# Patient Record
Sex: FEMALE | Race: WHITE | NOT HISPANIC OR LATINO | Employment: FULL TIME | URBAN - METROPOLITAN AREA
[De-identification: names, ages, dates, MRNs, and addresses within clinical notes are randomized per-mention and may not be internally consistent; named-entity substitution may affect disease eponyms.]

---
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
## 2021-01-04 ENCOUNTER — HEALTH MAINTENANCE LETTER (OUTPATIENT)
Age: 21
End: 2021-01-04

## 2021-04-06 ENCOUNTER — COMMUNICATION - HEALTHEAST (OUTPATIENT)
Dept: FAMILY MEDICINE | Facility: CLINIC | Age: 21
End: 2021-04-06

## 2021-04-06 ENCOUNTER — OFFICE VISIT - HEALTHEAST (OUTPATIENT)
Dept: FAMILY MEDICINE | Facility: CLINIC | Age: 21
End: 2021-04-06

## 2021-04-06 DIAGNOSIS — F41.0 PANIC ATTACK: ICD-10-CM

## 2021-04-06 DIAGNOSIS — G47.09 OTHER INSOMNIA: ICD-10-CM

## 2021-04-06 DIAGNOSIS — F33.1 MODERATE RECURRENT MAJOR DEPRESSION (H): ICD-10-CM

## 2021-04-06 DIAGNOSIS — E66.3 OVERWEIGHT: ICD-10-CM

## 2021-04-06 DIAGNOSIS — F43.22 ADJUSTMENT DISORDER WITH ANXIOUS MOOD: ICD-10-CM

## 2021-04-06 DIAGNOSIS — F41.9 ANXIETY: ICD-10-CM

## 2021-04-06 DIAGNOSIS — F32.1 MODERATE MAJOR DEPRESSION (H): ICD-10-CM

## 2021-04-06 ASSESSMENT — PATIENT HEALTH QUESTIONNAIRE - PHQ9: SUM OF ALL RESPONSES TO PHQ QUESTIONS 1-9: 9

## 2021-04-06 ASSESSMENT — ANXIETY QUESTIONNAIRES
1. FEELING NERVOUS, ANXIOUS, OR ON EDGE: MORE THAN HALF THE DAYS
IF YOU CHECKED OFF ANY PROBLEMS ON THIS QUESTIONNAIRE, HOW DIFFICULT HAVE THESE PROBLEMS MADE IT FOR YOU TO DO YOUR WORK, TAKE CARE OF THINGS AT HOME, OR GET ALONG WITH OTHER PEOPLE: SOMEWHAT DIFFICULT
3. WORRYING TOO MUCH ABOUT DIFFERENT THINGS: MORE THAN HALF THE DAYS
GAD7 TOTAL SCORE: 14
5. BEING SO RESTLESS THAT IT IS HARD TO SIT STILL: MORE THAN HALF THE DAYS
7. FEELING AFRAID AS IF SOMETHING AWFUL MIGHT HAPPEN: NEARLY EVERY DAY
6. BECOMING EASILY ANNOYED OR IRRITABLE: SEVERAL DAYS
2. NOT BEING ABLE TO STOP OR CONTROL WORRYING: MORE THAN HALF THE DAYS
4. TROUBLE RELAXING: MORE THAN HALF THE DAYS

## 2021-04-06 ASSESSMENT — MIFFLIN-ST. JEOR: SCORE: 1628.18

## 2021-04-27 ENCOUNTER — OFFICE VISIT - HEALTHEAST (OUTPATIENT)
Dept: FAMILY MEDICINE | Facility: CLINIC | Age: 21
End: 2021-04-27

## 2021-04-27 DIAGNOSIS — R43.8 BAD TASTE IN MOUTH: ICD-10-CM

## 2021-04-27 DIAGNOSIS — F32.1 MODERATE MAJOR DEPRESSION (H): ICD-10-CM

## 2021-04-27 DIAGNOSIS — F43.22 ADJUSTMENT DISORDER WITH ANXIOUS MOOD: ICD-10-CM

## 2021-04-27 DIAGNOSIS — B07.8 COMMON WART: ICD-10-CM

## 2021-04-27 ASSESSMENT — MIFFLIN-ST. JEOR: SCORE: 1629.31

## 2021-04-28 ENCOUNTER — COMMUNICATION - HEALTHEAST (OUTPATIENT)
Dept: NURSING | Facility: CLINIC | Age: 21
End: 2021-04-28

## 2021-05-05 ENCOUNTER — COMMUNICATION - HEALTHEAST (OUTPATIENT)
Dept: FAMILY MEDICINE | Facility: CLINIC | Age: 21
End: 2021-05-05
Payer: COMMERCIAL

## 2021-05-18 ENCOUNTER — OFFICE VISIT - HEALTHEAST (OUTPATIENT)
Dept: FAMILY MEDICINE | Facility: CLINIC | Age: 21
End: 2021-05-18

## 2021-05-18 DIAGNOSIS — Z59.9 FINANCIAL PROBLEMS: ICD-10-CM

## 2021-05-18 DIAGNOSIS — F32.1 MODERATE MAJOR DEPRESSION (H): ICD-10-CM

## 2021-05-18 DIAGNOSIS — B07.0 PLANTAR WART OF RIGHT FOOT: ICD-10-CM

## 2021-05-18 DIAGNOSIS — F43.22 ADJUSTMENT DISORDER WITH ANXIOUS MOOD: ICD-10-CM

## 2021-05-18 DIAGNOSIS — G47.09 OTHER INSOMNIA: ICD-10-CM

## 2021-05-18 DIAGNOSIS — F41.9 ANXIETY: ICD-10-CM

## 2021-05-18 DIAGNOSIS — E66.3 OVERWEIGHT: ICD-10-CM

## 2021-05-18 SDOH — ECONOMIC STABILITY - INCOME SECURITY: PROBLEM RELATED TO HOUSING AND ECONOMIC CIRCUMSTANCES, UNSPECIFIED: Z59.9

## 2021-05-18 ASSESSMENT — MIFFLIN-ST. JEOR: SCORE: 1619.1

## 2021-05-19 ENCOUNTER — COMMUNICATION - HEALTHEAST (OUTPATIENT)
Dept: NURSING | Facility: CLINIC | Age: 21
End: 2021-05-19

## 2021-05-19 ENCOUNTER — RECORDS - HEALTHEAST (OUTPATIENT)
Dept: ADMINISTRATIVE | Facility: OTHER | Age: 21
End: 2021-05-19

## 2021-05-20 ENCOUNTER — RECORDS - HEALTHEAST (OUTPATIENT)
Dept: ADMINISTRATIVE | Facility: OTHER | Age: 21
End: 2021-05-20

## 2021-05-26 NOTE — TELEPHONE ENCOUNTER
No PA is needed for this medication and strength. Insurance allows up to 30 tablets taking once daily.   Called [facility] and according to pharmacist she fixed the problem on her end and now it is just a refill too soon until tomorrow 3/23/19.    They will call patient to let her know it is ready for  tomorrow.

## 2021-05-27 VITALS
WEIGHT: 180 LBS | HEIGHT: 67 IN | SYSTOLIC BLOOD PRESSURE: 120 MMHG | OXYGEN SATURATION: 99 % | TEMPERATURE: 98.2 F | HEART RATE: 94 BPM | DIASTOLIC BLOOD PRESSURE: 70 MMHG | BODY MASS INDEX: 28.25 KG/M2 | RESPIRATION RATE: 20 BRPM

## 2021-05-27 VITALS
OXYGEN SATURATION: 97 % | DIASTOLIC BLOOD PRESSURE: 68 MMHG | HEIGHT: 67 IN | TEMPERATURE: 98.4 F | RESPIRATION RATE: 28 BRPM | WEIGHT: 182 LBS | BODY MASS INDEX: 28.56 KG/M2 | SYSTOLIC BLOOD PRESSURE: 120 MMHG | HEART RATE: 107 BPM

## 2021-05-27 NOTE — TELEPHONE ENCOUNTER
Patient called back, reviewed provider message in regards to her test results. Patient has no further questions at this time.     Mihaela Coleman RN [facility] Triage/Med Refill 4/11/2019 4:38 PM

## 2021-05-27 NOTE — TELEPHONE ENCOUNTER
Patient notified per provider note below. The patient verbalizes understanding of provider/CSS instructions for follow-up and continued care per provider message.

## 2021-05-27 NOTE — PROGRESS NOTES
Assessment/Plan:        Diagnoses and all orders for this visit:    Thyromegaly- diffuse.  No know hx thyroid disease.  Will notify pt when lab results available.   -     Thyroid [place]    Sore throat- explained that if culture comes back positive, we will treat with abx  -     Rapid Strep A Screen- Throat Swab  -     Group A Strep, RNA Direct Detection, Throat    Encounter for counseling regarding contraception  Discussed in detail the types of contraception and their potential side effects.  Handout given from [email or web address], as well as a pictoral handout.  She would like to talk to her 2 older sisters about what they use and discuss it further at her appt with Dr. Gabriel next week.      Other orders  -     Meningococcal MCV4P          Subjective:    Patient ID: Dena Chavez is a 19 y.o. female.    HPI:  Has had a sensation of having a lump in the right side of her neck for the past 5 days.  Doesn't hurt when she swallows, eating and drinking OK.  No fever, cough, chills.  Has been having HA occasionally. No known exposures to anyone that is ill. No excess fatigue.      Would like to learn about the different forms of contraception.  Has never had sex.  Declined chlamydia testing today.  Has had irregular periods ever since her periods started.  No cramping with her periods.        Graduation from  this year.  Thinks she would like to start a business, not sure of what kind.     The following portions of the patient's history were reviewed and updated as appropriate: allergies, current medications, past family history, past medical history, past social history, past surgical history and problem list.    Review of Systems      12 sys rev neg other than HPI    Objective:    Physical Exam       Patient is in no apparent physical distress.  Vitals are as recorded.  Head and face are normal. TM's and external canals nl. Pharynx clear, no tonsils, no erythema or exudate.  Conjunctiva are clear.  Neck is  without adenopathy or masses.Thyroid mildly diffusely enlarged.   Cardiovascular :  Regular rate and rhythm with no murmurs.  Lungs are clear with good air movement bilaterally.  Extremities are without edema.  Gait is normal.  Skin is without rashes.  Mood and affect are appropriate.thought processes and judgement nl.

## 2021-05-27 NOTE — PROGRESS NOTES
OFFICE VISIT NOTE      Assessment & Plan   Dena Chavez is a 19 y.o. female with anxiety and depression. She thinks her main problem is in the winter and now, with better weather, that she's fine. She denies panic attacks but admits she's still failing math. She agrees to keep working at that. She stopped taking her meds on her own and refuses to restart them. She does not want to do any more counseling because she feels better. I asked her to resume the counseling because she can now learn things that will help her next fall with seasonal depression/anxiety. She agreed with the idea I presented but did not commit to doing anything    Exercise -she is doing some. Sleep is reportedly OK. No suicidal ideation.    She asked for information about contraception, but then said she's not sexually active, so she'll wait to use it. If she uses it, she thinks she would want depo or Nexplanon because she would not remember to take the pill daily.    Diagnoses and all orders for this visit:    Overweight    Anxiety    Stress due to family tension    Panic attack    Adjustment disorder with anxious mood    Other insomnia        Kamryn Gabriel MD    The following are part of a depression follow up plan for the patient:  under care of mental health counselor, coping support assessment and coping support management  The following high BMI interventions were performed this visit: encouragement to exercise          Subjective:   Chief Complaint:  Follow-up (anxiety)    19 y.o. female.     1) good - weather is nice  Did counseling once - does not want to do it more - was told she has a $40 copay at the  and she's not sure why.    2) not passing math - getting some help    3) gets to sleep every night, about 11pm - 7am; 1st hour is usually late.    4) works - good - does 20hrs/week    5) walks outside now that it is nice out.    6) panic - occasionally, does not use med for it    7) stopped taking the medication -  "since last visit    8) birth control - wants to know options    Current Outpatient Medications   Medication Sig     buPROPion (WELLBUTRIN XL) 150 MG 24 hr tablet Take 1 tablet (150 mg total) by mouth daily.     cholecalciferol, vitamin D3, 5,000 unit capsule Take 1 capsule (5,000 Units total) by mouth daily.     hydrOXYzine HCl (ATARAX) 25 MG tablet Take 1 tablet (25 mg total) by mouth 3 (three) times a day as needed for itching. Or for anxiety       PSFHx: appropriate sections of PMH completed/filled in   Tobacco Status:  She  reports that she has never smoked. She has never used smokeless tobacco.    Review of Systems:  No fever.  No rash. All other systems negative except as noted above.    Objective:    /60   Pulse 108   Temp 99.2  F (37.3  C) (Oral)   Resp 12   Ht 5' 7\" (1.702 m)   Wt 175 lb 4 oz (79.5 kg)   LMP 03/18/2019 (LMP Unknown)   SpO2 99%   BMI 27.45 kg/m    GENERAL: No acute distress.  Mood: low to fair  Insight: poor  Judgment: fair  Affect: somewhat flat    Spent 40 min face to face with patient with more the 50% spent in counseling, reviewing chart, and coordination of care and discussing medications and supplements, counseling, exercise, sleep, stress and coping, work and school, following through, prevention.    "

## 2021-05-27 NOTE — PROGRESS NOTES
"Initial Psychotherapy Diagnostic Assessment     [x] Standard  [] Updated    Date(s): 3/25/19  Start Time: 1400  Stop Time: 1450    Patient Name:  Dena Chavez  Age: 19 y.o.   :  2000  MRN:  [id number]    Session Type: Patient is presenting for an Individual session.   Session #1    Reason for Referral:  Ms. Chavez is a 19 y.o. year-old female who was referred by Dr. Kamryn Gabrile for an evaluation of cognitive, behavioral, and emotional functioning.  The patient was made aware of the role of psychology service in the patient's care, risks and benefits, and the limits of confidentiality. The patient agreed to proceed.         Persons Present: Patient and Therapist     Sources references in obtaining this information  Face-to-face interview, patient chart, adult intake questionnaire, measures completed, WHOADAS, C-SSRS, CAGE, PHQ-9 and MIRYAM-7    Presenting Problem/History:  Patient stated \"Dr. Gabriel thinks that it could be helpful to me. I have anxiety and it gets bad in the winter and then it gets better.  I feel  just really bad and I feel like I am getting a breaking point.\"Patient reports having a history of feeling sad and depressed during the winter months but improving her mood when he becomes warmer and sunnier outside.  She is also experiencing multiple other stressors such as on the verge of graduating from high school, was living with mother although evicted from an apartment, and now living with her grandmother and father until her father finishes at home then she will be moving in with him within the next few months.    Patient s expectation for treatment   \"I want to have less anxiety and find ways to overcome it.\"         Functional Impairments:  Personal: 2  Family: 2  Work: 2  Social:2     How does the presenting problem affect patients daily functioning:    Patient stated that it just makes it somewhat difficult when her mood fluctuates and is hard to get up in the " morning.    Issues/Stressors:       Physical Problems: Dizzines, Flushes or chills, Chest pain, Rapid heart pounding, Diarrhea and Trembling, shortness of breath sleeping too much decreased energy decreased appetite, headaches nausea vomiting and weight loss.    Social Problems: Communications problems and Distrust of others and decreased social activity and loss of interest in activities.    Behavioral Problems: Restricted eating, self-induced vomiting, and verbal aggression.    Cognitive Problems: Poor attention and Indecisiveness, racing thoughts, procrastination, paranoia and worry    Emotional Problems: Anxious, Angry, Irritable, Emptiness, Boredom, Restricted emotion, Depressed mood, Feelings of shame, Feelings of guilt and Lack of self confidence     Onset/Frequency/Duration presenting problem symptoms:    Patient stated she has been having many of the symptoms occurring within the last couple of months.    How does the patient perceive her problem in relation to how others see his/her problem?    Patient sees other people being able to manage the problems much easier than her.    Family/Social History:     Marriages/Significant other: She had been living with her mother in an apartment although, her mother was then affected from that apartment.  She went to then go live with her father at her grandmother's home.    Children:   No children    Parents:   Parents are living.  They were  20 years then .  Father is self-employed.  Mother works at [employer].    Siblings:    Patient has 2 older sisters.  Patient stated she has a good relationship with them although, wishes that she could see them more frequently.    Education:   Currently in the 12th grade will be graduating within the next 2 months.  Employed part-time at [employer].      Developmental factors: Stated that she had a concussion in 2016 playing softball    Significant personal relationships including patient s evaluation of the  "relationship quality:   Stated that she does not really have a good support system.  \"I have people to talk to but I just do not talk to them.\"    Sexual/physical/emotional/financial abuse/traumatic event:    Denies any physical emotional or financial abuse or traumatic event    Contextual Non-personal factors contributing to the patients concerns:    Patient stated that was very difficult experiencing she and her mother being evicted from an apartment.    Strengths/personal resources:    States that she has a good  and a hard worker    Weaknesses  \"I am very shy at times and I do not talk about my feelings.\"    Support network(s)/Resources:   States that she is very close to her father    Belief system:    Amish    Cultural influences and impact on patient:    None indicated by patient    Cultural impact on health and health care:   Western medicine    Current living situation:    Currently lives with her grandmother and father.  Will be moving in with her father once their home has been renovated.    Work History:   Works part-time at [employer]    Financial Concerns:    None indicated by patient at this time    Legal Problems:   None indicated by patient at this time    Patient Medical History  Ms. Chavez's medical history is significant for   Past Medical History:   Diagnosis Date     Adjustment disorder 2019     Migraines      Panic 2019   .    Current Medications:  Current Outpatient Medications   Medication Sig     buPROPion (WELLBUTRIN XL) 150 MG 24 hr tablet Take 1 tablet (150 mg total) by mouth daily.     cholecalciferol, vitamin D3, 5,000 unit capsule Take 1 capsule (5,000 Units total) by mouth daily.     hydrOXYzine HCl (ATARAX) 25 MG tablet Take 1 tablet (25 mg total) by mouth 3 (three) times a day as needed for itching. Or for anxiety       Past Mental Health History:    Previous mental health diagnosis:  History of depression    Hx of Mental Health Treatment or Services:  Treated by " family physician    Hx of MH Tx/Hospitalizations:  No history of MH hospitalizations    Hx of Psychiatric Medications:  Was prescribed Wellbutrin 150 mg.  Patient stated that she has decided at this time not to take the prescribed medication.    Self Report Measures:    On the Patient Health Questionnaire-9, a self report measure of depressive symptomatology, she obtained a score of 13 , placing her in the range of moderate depression.     On the Generalized Anxiety Disorder-7, a self-report measure of anxiety, she obtained a score of 7,  placing her in the range of mild anxiety.      Suicidal/Homicidal Risk Assessment:    Suicidal: None reported  Ideation: No history of ideation  History of Past Attempt(s): description: No history of attempts  Crisis Plan:     Homicidal: None reported   Ideation: No history of ideation  History of Aggression none noted      Dane Suicide Severity Risk Screen:  There is no indication of risk for suicidal thoughts or ideations.  History of destruction to property:  None indicated by patient    Family Mental Health/Medical History    Family Mental Health:    Patient indicated no family history of mental health issues    Family history of Suicide:  Patient indicated no family history of suicide.    Family history Chemical Dependency:      Family Medical history: Family medical history is significant for:   Family History   Problem Relation Age of Onset     Hypertension Mother      Asthma Mother    .        Chemical Use/Abuse History    CAGE-AID (screening to determine a patients use/abuse/dependency):      0/4    1.  Have you ever felt you ought to cut down on your drinking or drug use?  2.  Have people annoyed you by criticizing your drinking or drug use?  3.  Have you felt bad or guilty about your drinking or drug use?  4.  Have you ever had a drink or use drugs first thing in the morning to steady her nerves are to get rid of a hangover (eye-opener)?      Alcohol:   [] None  Reported    [] Yes   [x] No       Street Drugs:   [] None Reported    [] Yes   [x] No    Prescription Drugs:   [] None Reported    [] Yes   [x] No  Type: Prescribed by family physician although patient has decided not to take the medication   Frequency (daily, weekly, occasionally): See above documentation  Tobacco:   [] None Reported    [] Yes   [x] No  Caffeine:   [] None Reported    [] Yes   [x] No  Currently in a treatment program:   [] Yes   [x] No    History of CD Treatment:      [x] None Reported               YADIRA Received:    [] Yes   [x] No       Collaborative info requested/received:   [] Yes   [x] No      MENTAL STATUS EVALUATION  Grooming: Well groomed  Attire: Appropriate  Age: Appears Stated  Behavior Towards Examiner: Cooperative  Motor Activity: Within normal   Eye Contact: Appropriate  Mood: Euthymic  Affect: Congruent w/content of speech  Speech/Language: Within normal  Attention: Within normal  Concentration: Within normal  Thought Process: Within normal  Thought Content: Hallucinations: Within noraml  Delusions: Within normal  Orientation: X 3  Memory: No Evidence of Impairment  Judgement: No Evidence of Impairment  Estimated Intelligence: Average  Demonstrated Insight: Adequate  Fund of Knowledge: adequate    Clinical Impressions/Assessment/Recommendations:   The patient is a 19 y.o. year-old, single female who is referred by Dr. Kamryn Gabriel for an evaluation of cognitive, behavioral, and emotional functioning.   Therapist and patient reviewed consent and privacy policy.  Patient reported understanding and signed document it was then scanned into EMR.  Patient presented on time, was well groomed, oriented and alert.  She was calm and cooperative in session.  She endorses motivation to actively seek changes and improvements in her life.  Patient recalls having difficulty experiencing a recent eviction from an apartment she and her mother had resided in.  She is currently living with her  grand mother and her father.  After her father renovates a home she and her father will be moving in there.  Patient also is in the process of getting ready to graduate from high school.   Patient refers to herself is very shy and having difficulty initiating conversations with people she does not know.   She has no history of mental health hospitalizations.  Patient would likely benefit from  motivational interviewing, active listening, reassurance and support in the context of cognitive behavioral therapy to address the above.    Prioritization of needed mental Health ancillary or other services.   Patient's main priority is to establish care with psychotherapist to address symptoms of anxiety and depression.  Referral from PCP was for psychotherapy services.  Patient did not request psychiatry services upon intake.  PCP has started prescribing psychotropic medications for symptom management although patient at this point has decided not to take that medication.  How Diagnostic criteria is met: Patient endorses the following symptoms of anxiousness, inability to control worry, worry about different things, trouble relaxing, irritability and dizziness.  Patient reports experiencing feeling significantly better during the summer months in the winter months.  The PHQ 9 scoring 13 indicating moderate level of depression.  The MIRYAM 7 scoring 7 indicating mild anxiety.  Although, there were a number of multiple physical social behavioral cognitive and emotional problems indicated on the patient's questionnaire form.  It appears that when the check sheet was more specifically broken down patient was able to indicate those symptoms that are affecting her daily life.  Recommendations   It was recommended that patient follow with the physician's orders taking the medication prescribed.  As well as then following up with her family physician.  Diagnosis   Adjustment reaction with anxiety and depressed mood  WHODAS 2.0 12-item  version 52.1%  SEVERE problem - (high, extreme,  ) - 50-95 %   H1= drop down percentages 15  H2= drop down percentages  4  H3= drop down percentages  5  Assessment of client resolving presenting mental health concerns:  Ability  [] low     [x] average     [] high  Motivation [] low     [x] average     [] high  Willingness [] low     [x] average     [] high    Initial Therapy Plan     1. Patient and therapist will develop therapeutic relationship.  2. Patient to present for follow up appointment to initiate psychotherapy services.  3. Patient to continue to follow up with primary care physician as needed and take medications as prescribed.  4. Develop comprehensive treatment plan.    Is patient's family involved in the treatment?  [x] No     [] Yes      If no, Why?  At this time patient is choosing not to involve family members.      Therapist s Signature/Supervision/co-signature statement:     Lar[facility]osa [facility]  [facility]/19

## 2021-05-27 NOTE — TELEPHONE ENCOUNTER
"Please call Dena. Let her know that this is what we found as far as her co-pay (she was charged $40 at her last visit):       \"COPAY WAIVED FOR PREVENTATIVE SERVICES. $40 COPAY FOR FIRST 3 VISITS. THEN 100% UNTIL DEDUCTIBLE IS MET. THEN 25% COINSURANCE.\"     Copays are required by the insurance that she chose.  Even if we don't collect it at the time of services, it will still get billed to her.  In this case, she shouldn't had to pay a copay because she was already seen 3 times after she became eligible on 2/1/19.  If she did pay, billing will refund or credit her account once it gets bill and return from the ins.  If she does not like the coverage, I suggest she call the ins navigator at the [facility] and see what's the best insurance for her.       Thus, when I've asked her to come back in May AND if she returns to see Larissa, she should NOT have a copay.  "

## 2021-05-27 NOTE — TELEPHONE ENCOUNTER
----- Message from Jayshree Khalil MD sent at 4/11/2019  3:56 PM CDT -----  Please call pt and let her know that her thyroid test was filiberto.

## 2021-05-28 ASSESSMENT — ANXIETY QUESTIONNAIRES
GAD7 TOTAL SCORE: 13
GAD7 TOTAL SCORE: 14

## 2021-05-28 NOTE — PROGRESS NOTES
"OFFICE VISIT NOTE      Assessment & Plan   Dena Chavez is a 19 y.o. female who has really struggled with depression/anxiety plus some rapid-fire adjustments in the past several months. However, she now knows she will graduate from high school (in June), the weather is better, she's moving into a house with her dad and she has a job. Things are looking up. Thus, at this time she will not pursue counseling, but I strongly encouraged her to take  The vit D supplement. I also encouraged good nutrition, good sleep and regular exercise.  F/u prn    Diagnoses and all orders for this visit:    MIRYAM (generalized anxiety disorder)  -     cholecalciferol, vitamin D3, 5,000 unit capsule; Take 1 capsule (5,000 Units total) by mouth daily.  Dispense: 90 capsule; Refill: 4    Screening for STD (sexually transmitted disease)  -     Cancel: Chlamydia trachomatis & Neisseria gonorrhoeae, Amplified Detection; Future; Expected date: 05/17/2019    Anxiety    Other insomnia    Adjustment disorder with anxious mood    Stress due to family tension    Overweight    Panic attack        Kamryn Gabriel MD    The following are part of a depression follow up plan for the patient:  coping support assessment and coping support management  The following high BMI interventions were performed this visit: encouragement to exercise          Subjective:   Chief Complaint:  Follow-up (anxiety)    19 y.o. female.     1) depression/anxeity - much better, no longer disabling  She has gone in for extra math help and knows she'll pass math --which means she will graduate. Family will come into town for this, and she's looking forward to it!  She has a few \"not great\" things going on at work, and plans to talk to the  about them. She works 4-9pm at the [employer].   She might end up looking for a new job.  She and her dad are moving out from [place] and into his house - which is not fully remodeled, but it's better than all the road " "construction by grandma's.    2) not exercising, not taking vit D  3) sleep is OK, getting 7-8hrs/noc without taking medication      Current Outpatient Medications   Medication Sig     cholecalciferol, vitamin D3, 5,000 unit capsule Take 1 capsule (5,000 Units total) by mouth daily.     hydrOXYzine HCl (ATARAX) 25 MG tablet Take 1 tablet (25 mg total) by mouth 3 (three) times a day as needed for itching. Or for anxiety       PSFHx: appropriate sections of PMH completed/filled in   Tobacco Status:  She  reports that she has never smoked. She has never used smokeless tobacco.    Review of Systems:  No fever.  No rash. All other systems negative except as noted above.    Objective:    /74   Pulse 80   Temp 98.8  F (37.1  C) (Oral)   Resp 12   Ht 5' 7\" (1.702 m)   Wt 175 lb (79.4 kg)   LMP  (LMP Unknown)   SpO2 99%   BMI 27.41 kg/m    GENERAL: No acute distress.  Mood: fair to good  Insight: fair  Judgment: fair  Affect: reserved/flat    Spent 25 min face to face with patient with more the 50% spent in counseling, reviewing chart, and coordination of care and discussing depression, anxiety and vit D deficiency.    "

## 2021-05-30 VITALS — WEIGHT: 179 LBS | BODY MASS INDEX: 28.77 KG/M2 | HEIGHT: 66 IN

## 2021-05-31 VITALS — BODY MASS INDEX: 30.76 KG/M2 | HEIGHT: 67 IN | WEIGHT: 196 LBS

## 2021-06-02 VITALS — WEIGHT: 173.12 LBS | HEIGHT: 67 IN | BODY MASS INDEX: 27.17 KG/M2

## 2021-06-02 VITALS — BODY MASS INDEX: 27.47 KG/M2 | HEIGHT: 67 IN | WEIGHT: 175 LBS

## 2021-06-02 VITALS — HEIGHT: 67 IN | BODY MASS INDEX: 27.82 KG/M2 | WEIGHT: 177.25 LBS

## 2021-06-02 VITALS — HEIGHT: 67 IN | BODY MASS INDEX: 27.35 KG/M2 | WEIGHT: 174.25 LBS

## 2021-06-02 VITALS — BODY MASS INDEX: 27.51 KG/M2 | HEIGHT: 67 IN | WEIGHT: 175.25 LBS

## 2021-06-02 VITALS — WEIGHT: 180 LBS | BODY MASS INDEX: 27.28 KG/M2 | HEIGHT: 68 IN

## 2021-06-02 VITALS — BODY MASS INDEX: 27.74 KG/M2 | HEIGHT: 68 IN | WEIGHT: 183 LBS

## 2021-06-03 NOTE — TELEPHONE ENCOUNTER
Unable to leave message, if she calls back let her know she is due for a physical, chlamydia screening and a depression f/u and assist with scheduling. Thanks

## 2021-06-05 NOTE — PROGRESS NOTES
PSYCHOTHERAPY DISCHARGE SUMMARY      Patient Name Jessica Chavez     [birth year]    Dates of service  3/25/19                                                   Number of Sessions  1     Reason for referral/ Presenting issues:  Ms. Chavez is a 19 y.o. year-old female who was referred by   Dr.Jeanette Gabriel for an evaluation of cognitive, behavioral, and emotional functioning. Patient reports having a history of feeling sad and depressed during the winter months but improving her mood when he becomes warmer and sunnier outside.  She is also experiencing multiple other stressors such as on the verge of graduating from high school, was living with mother although evicted from an apartment, and now living with her grandmother and father until her father finishes at home then she will be moving in with him within the next few months.     Diagnosis at Intake:    Adjustment reaction with anxiety and depressed mood      Additional comments and scores   Functional Impairments:  Personal: 2  Family: 2  Work: 2  Social:2     WHODAS 2.0 12-item version 52.1%  SEVERE problem - (high, extreme,  ) - 50-95 %   H1= drop down percentages 15  H2= drop down percentages  4    Diagnosis at Discharge:  Adjustment reaction with anxiety and depressed mood  Reason for discharge:  Patient dropped out       Prognosis at discharge:  Guarded          Recommendations and referrals at discharge  1. To continue Therapy.   2.  Letter sent to patient regarding writer's departure from clinic and treatment      options.     Provider Signature: Larissa BISWAS   20

## 2021-06-08 ENCOUNTER — OFFICE VISIT - HEALTHEAST (OUTPATIENT)
Dept: FAMILY MEDICINE | Facility: CLINIC | Age: 21
End: 2021-06-08

## 2021-06-08 DIAGNOSIS — G47.09 OTHER INSOMNIA: ICD-10-CM

## 2021-06-08 DIAGNOSIS — B07.8 OTHER VIRAL WARTS: ICD-10-CM

## 2021-06-08 DIAGNOSIS — E66.3 OVERWEIGHT: ICD-10-CM

## 2021-06-08 DIAGNOSIS — Z59.9 FINANCIAL PROBLEMS: ICD-10-CM

## 2021-06-08 DIAGNOSIS — F41.9 ANXIETY: ICD-10-CM

## 2021-06-08 DIAGNOSIS — F43.22 ADJUSTMENT DISORDER WITH ANXIOUS MOOD: ICD-10-CM

## 2021-06-08 DIAGNOSIS — F32.1 MODERATE MAJOR DEPRESSION (H): ICD-10-CM

## 2021-06-08 SDOH — ECONOMIC STABILITY - INCOME SECURITY: PROBLEM RELATED TO HOUSING AND ECONOMIC CIRCUMSTANCES, UNSPECIFIED: Z59.9

## 2021-06-08 ASSESSMENT — MIFFLIN-ST. JEOR: SCORE: 1619.1

## 2021-06-08 NOTE — TELEPHONE ENCOUNTER
Coronavirus (COVID-19) Notification    Lab Result   Lab test 2019-nCoV rRt-PCR OR SARS-COV-2 PCR    Nasopharyngeal AND/OR Oropharyngeal swab is NEGATIVE for 2019-nCoV RNA [OR] SARS-COV-2 RNA (COVID-19) RNA    Your result was negative. This means that we didn't find the virus that causes COVID-19 in your sample. A test may show negative when you do actually have the virus. This can happen when the virus is in the early stages of infection, before you feel illness symptoms.    If you have symptoms   Stay home and away from others (self-isolate) until you meet ALL of the guidelines below:    You've had no fever--and no medicine that reduces fever--for 3 full days (72 hours). And      Your other symptoms have gotten better. For example, your cough or breathing has improved. And     At least 10 days have passed since your symptoms started.    During this time:    Stay home. Don't go to work, school or anywhere else.     Stay in your own room, including for meals. Use your own bathroom if you can.    Stay away from others in your home. No hugging, kissing or shaking hands. No visitors.    Clean  high touch  surfaces often (doorknobs, counters, handles, etc.). Use a household cleaning spray or wipes. You can find a full list on the EPA website at www.epa.gov/pesticide-registration/list-n-disinfectants-use-against-sars-cov-2.    Cover your mouth and nose with a mask, tissue or washcloth to avoid spreading germs.    Wash your hands and face often with soap and water.    Going back to work  Check with your employer for any guidelines to follow for going back to work.  You are sent a letter for your Employer which will serve as formal document notice that you, the employee, tested negative for COVID-19, as of the testing date shown above.    If your symptoms worsen or other concerning symptoms, contact PCP, oncare or consider returning to Emergency Dept.    Where can I get more information?    [facility]:  [email or web address]    Coronavirus Basics: www.health.[email or web address]./diseases/coronavirus/basics.html    [facility] Hotline ([phone])    {Name]  Rebecca Bermudez RN  [employer] [facility] RN  Lung Nodule and ED Lab Result RN  Ph# [phone]

## 2021-06-09 NOTE — PROGRESS NOTES
Subjective:   Dena comes in today with a four-day history of ear pain and jaw pain.  She states if she lays on one side to long at night that ear will start to pain that she is laying on.  She's had minimal congestion associated with this.  She's had a little bit of a headache.  She denies any fevers, sweats or chills.  She's had no stomach upset or appetite changes.  She denies rash of any type.      Objective:  HEENT: Both TMs appear gray.  No erythema noted.  No perforation present.  No exudate noted.  Sinuses are nontender in maxillary and frontal areas.  Contrast are clear.  Nasal mucosa minimally inflamed.  Pharynx does reveal erythema but no exudate.  Tonsils have been removed surgically.  Neck: There is tender lymphadenopathy anteriorly in the submandibular areas.  No posterior adenopathy present.  Lungs: Lungs are grossly clear.  Cardiac: There is a regular rhythm present.  No murmur heard today.  Skin: Skin reveals no rash      Assessment:  1.  Strep pharyngitis      Plan:  Start Penicillin  mg twice daily.  Use Tylenol for fever control.  Use ibuprofen for body aches.  Try to increase liquid intake.  Follow up here as needed.  She was given a note to be off of school on 3/28/17.

## 2021-06-09 NOTE — PROGRESS NOTES
"Dena Chavez is a 20 y.o. female who is being evaluated via a billable telephone visit.      The patient has been notified of following:     \"This telephone visit will be conducted via a call between you and your physician/provider. We have found that certain health care needs can be provided without the need for a physical exam.  This service lets us provide the care you need with a short phone conversation.  If a prescription is necessary we can send it directly to your pharmacy.  If lab work is needed we can place an order for that and you can then stop by our lab to have the test done at a later time.    Telephone visits are billed at different rates depending on your insurance coverage. During this emergency period, for some insurers they may be billed the same as an in-person visit.  Please reach out to your insurance provider with any questions.    If during the course of the call the physician/provider feels a telephone visit is not appropriate, you will not be charged for this service.\"    Patient has given verbal consent to a Telephone visit? Yes    What phone number would you like to be contacted at? [phone]    Patient would like to receive their AVS by          HPI - 19 yo female with phone visit b/c of having headaches since 7/5/20.   Most days. On 7/5 bad headache all day with nausea and the next day. It improved for a couple of days and then got worse over the weekend. Slightly improved today.   Headache in back of head and neck.   No significant vision changes or aura  Mild photosensitivity  No fever or sore throat  Sick contacts - on July 4th with friend's family and someone at the party and tested positive for COVID. She has fever and no taste.   Does not like meds, so only drinks water and lies in bed.   Denies alcohol use    She is worried about concussion.   She was tubing and jet skiing on the [facility]. No head injury.     Parasites/amoeba in [place] water - sx have been off and on that is " not c/w amoeba infection    covid neg 6/10/20   Tested b/c shortness of breath and unclear if related to anxiety  Scheduled to be retested for COVID on Wed     Patient Active Problem List   Diagnosis     Allergic Rhinitis     Paresthesias     Stress due to family tension     Overweight     Panic attack     Adjustment disorder with anxious mood     Other insomnia     Anxiety     Current Outpatient Medications   Medication Sig     cholecalciferol, vitamin D3, 5,000 unit capsule Take 1 capsule (5,000 Units total) by mouth daily.     hydrOXYzine HCl (ATARAX) 25 MG tablet Take 1 tablet (25 mg total) by mouth 3 (three) times a day as needed for itching. Or for anxiety           Assessment/Plan:  Headaches with nausea  Awaiting COVID test scheduled in 2 days at urgent care in [place]  Encouraged hydration and trying tylenol and hydroxyzine  If covid neg and headache persists, then make apt of F2F  To further evaluation    Anxiety -  Encouraged hydroxyzine 1/2 tab at bedtime as needed      Phone call duration:  23minutes    Dr. Jasmyne Spears  7/13/2020

## 2021-06-09 NOTE — TELEPHONE ENCOUNTER
Changed her OV to telephone visit, because of her headaches. Pt states she also have nausea x 1 week.

## 2021-06-12 NOTE — PROGRESS NOTES
"OFFICE VISIT NOTE      Assessment & Plan   Dena Chavez is a 20 y.o. female who has anxiety with panic and depression. She agrees to try prn medication but does not want daily medication. She agrees to try counseling again, so I put in the order for that. I hope these things can help her quickly as the panic has been so disabling that she has not been to work in a week.      Diagnoses and all orders for this visit:    Adjustment disorder with anxious mood  -     propranoloL (INDERAL) 20 MG tablet; Take 1 tablet (20 mg total) by mouth 3 (three) times a day as needed (for anxiety).  Dispense: 90 tablet; Refill: 0  -     AMB REFERRAL TO MENTAL HEALTH AND ADDICTION  - Adult (18+); Outpatient Treatment; Individual/Couples/Family/Group Therapy/Health Psychology; [facility]; [facility]; We will contact you to schedule the appointment or please c...    Panic attack  -     propranoloL (INDERAL) 20 MG tablet; Take 1 tablet (20 mg total) by mouth 3 (three) times a day as needed (for anxiety).  Dispense: 90 tablet; Refill: 0  -     AMB REFERRAL TO MENTAL HEALTH AND ADDICTION  - Adult (18+); Outpatient Treatment; Individual/Couples/Family/Group Therapy/Health Psychology; [facility]; [facility]; We will contact you to schedule the appointment or please c...    MIRYAM (generalized anxiety disorder)  -     cholecalciferol, vitamin D3, 125 mcg (5,000 unit) capsule; Take 1 capsule (5,000 Units total) by mouth daily.  Dispense: 90 capsule; Refill: 4        Kamryn Gabriel MD    The following are part of a depression follow up plan for the patient:  under care of mental health counselor, coping support assessment and coping support management          Subjective:   Chief Complaint:  Anxiety    20 y.o. female.     1) nervous - heart beats quickly  Cries  Does not want to go to work  Breathes quickly  Feels \"bad\"  Has had to call in and not go to work.    Ready for therapy and " "vitamins    2) sleep - ok, goes to bed about midnight, up about 7 but doses x 2 hrs  7 hrs  Usually watches a movie or is videogaming with friends.  No friends at work. Boss is understanding.    3) eating ok, variety  Sometimes only eats once per day  Sometimes feels so anxious she throws up.    Current Outpatient Medications   Medication Sig     cholecalciferol, vitamin D3, 125 mcg (5,000 unit) capsule Take 1 capsule (5,000 Units total) by mouth daily.     hydrOXYzine HCl (ATARAX) 25 MG tablet Take 1 tablet (25 mg total) by mouth 3 (three) times a day as needed for itching. Or for anxiety     propranoloL (INDERAL) 20 MG tablet Take 1 tablet (20 mg total) by mouth 3 (three) times a day as needed (for anxiety).       PSFHx: appropriate sections of PMH completed/filled in   Tobacco Status:  She  reports that she has never smoked. She has never used smokeless tobacco.    Review of Systems:  No fever.  No rash. All other systems negative except as noted above.    Objective:    /72   Pulse (!) 110   Temp 99.3  F (37.4  C) (Oral)   Resp 18   Ht 5' 7\" (1.702 m)   Wt 181 lb 4 oz (82.2 kg)   LMP 09/14/2020 (Approximate)   SpO2 99%   BMI 28.39 kg/m    GENERAL: No acute distress.  Ht; reg s1s2  Lungs: clear    Mood: low but anxious  Affect: flat    Spent 25 min face to face with patient with more the 50% spent in counseling, education and coordination of care and discussing anxiety, panic, healthy choices with sleep, food and exercise, medications.    "

## 2021-06-13 NOTE — TELEPHONE ENCOUNTER
I called Dena and let her know we missed seeing her for her appointment today (12/2). I invited her to call and set up another appointment, by phone if she wishes, so I can hear how she is doing and see if there are other ways we can help.

## 2021-06-15 ENCOUNTER — COMMUNICATION - HEALTHEAST (OUTPATIENT)
Dept: FAMILY MEDICINE | Facility: CLINIC | Age: 21
End: 2021-06-15

## 2021-06-15 ENCOUNTER — COMMUNICATION - HEALTHEAST (OUTPATIENT)
Dept: ADMINISTRATIVE | Facility: CLINIC | Age: 21
End: 2021-06-15

## 2021-06-15 ENCOUNTER — OFFICE VISIT - HEALTHEAST (OUTPATIENT)
Dept: FAMILY MEDICINE | Facility: CLINIC | Age: 21
End: 2021-06-15

## 2021-06-15 DIAGNOSIS — L03.119 CELLULITIS AND ABSCESS OF FOOT EXCLUDING TOE: ICD-10-CM

## 2021-06-15 DIAGNOSIS — L02.619 CELLULITIS AND ABSCESS OF FOOT EXCLUDING TOE: ICD-10-CM

## 2021-06-16 PROBLEM — G47.09 OTHER INSOMNIA: Status: ACTIVE | Noted: 2019-02-27

## 2021-06-16 PROBLEM — F41.9 ANXIETY: Status: ACTIVE | Noted: 2019-04-16

## 2021-06-16 PROBLEM — E66.3 OVERWEIGHT: Status: ACTIVE | Noted: 2019-02-07

## 2021-06-16 PROBLEM — R20.2 PARESTHESIAS: Status: ACTIVE | Noted: 2019-01-18

## 2021-06-16 PROBLEM — F43.22 ADJUSTMENT DISORDER WITH ANXIOUS MOOD: Status: ACTIVE | Noted: 2019-02-07

## 2021-06-16 PROBLEM — F32.1 MODERATE MAJOR DEPRESSION (H): Status: ACTIVE | Noted: 2021-04-06

## 2021-06-16 NOTE — TELEPHONE ENCOUNTER
Telephone Encounter by Melida Elizondo at 1/28/2019  8:03 AM     Author: Melida Elizondo Service: -- Author Type: --    Filed: 1/28/2019  8:04 AM Encounter Date: 1/23/2019 Status: Signed    : Melida Elizondo       PRIOR AUTHORIZATION DENIED    Denial Rational: PATIENT NEEDS TO TRY/FAIL 3 FORMULARY ALTERNATIVES FROM THE LIST BELOW.        Appeal Information:

## 2021-06-16 NOTE — PROGRESS NOTES
Subjective:   Dena comes in today for a school physical.  She is going out for sports.  She is going to be playing softball.  She has no real health issues or concerns at this point in time.  She is feeling well.  She is not on any chronic medications.  She thinks she eats a good diet.  She stays fairly active.      Objective:  Please see physical exam form on the chart.      Assessment:  1.  School physical/sports physical.  Normal exam      Plan:  Patient will have no restrictions at school.  She was given a copy of the clearance form to take to school.  She will follow-up here as needed.

## 2021-06-16 NOTE — TELEPHONE ENCOUNTER
Around April 28, please call Patricio and ask her if she's completed the application for medical assistance yet? If she's done it, when did she do it? If she has not done it, Dr. Gabriel strongly encourages her to do it soon!

## 2021-06-16 NOTE — TELEPHONE ENCOUNTER
Telephone Encounter by Melida Elizondo at 1/25/2019 10:36 AM     Author: Melida Elizondo Service: -- Author Type: --    Filed: 1/25/2019 10:37 AM Encounter Date: 1/23/2019 Status: Signed    : Melida Elizondo       PA INITIATION

## 2021-06-16 NOTE — PROGRESS NOTES
OFFICE VISIT NOTE      Assessment & Plan   Dena Chavez is a 21 y.o. female with moderate major depression plus anxiety with panic (mainly symptomatic with rapid heart rate). This is recurrent -her first episode was during her senior year of high school when her mom became homeless. She strongly prefers not to take any medication, but she is willing to take a vit D supplement, exercise and see a therapist. Since she is not suicidal, I accepted her preference at this time.    In order to do therapy, she needs to pursue medical assistance. She's currently riding on her dad's insurance which he buys for himself since he is self-employed. It does not include vision, dental or mental health. She plans to do the MA application herself (she declines assistance).      Diagnoses and all orders for this visit:    Moderate recurrent major depression (H)    Moderate major depression (H)    Anxiety    Other insomnia    Overweight        Kamryn Gabriel MD    45 minutes spent on the date of the encounter doing chart review, history and exam, documentation and further activities per the note. In addition to that time, I treated her wart.        Subjective:   Chief Complaint:  Anxiety    21 y.o. female.     1) about the same with mood - low  Walks to feel better  If she's at work she does breathing exercises  Symptoms - heart races, shakey, dizzy, headaches, slight shortness of breath and some chest pressure    Walks daily  Has a gym membership at [employer] but has not gone since the first two weeks    Does not take any meds- fears a bad side effect and just prefers not to    2) needs new insurance, then will get a therapist. dad is self-employed - his insurance is not great and that's what Patricio is on now  Mom - is on MA    4) tetanus not due till 2023    5) wart on right foot. She's had it at least 2 years. She wants to have it treated    Current Outpatient Medications   Medication Sig     hydrOXYzine HCl (ATARAX) 25 MG  "tablet Take 1 tablet (25 mg total) by mouth 3 (three) times a day as needed for itching. Or for anxiety     cholecalciferol, vitamin D3, 125 mcg (5,000 unit) capsule Take 1 capsule (5,000 Units total) by mouth daily.     propranoloL (INDERAL) 20 MG tablet Take 1 tablet (20 mg total) by mouth 3 (three) times a day as needed (for anxiety).       PSFHx: appropriate sections of PMH completed/filled in   Tobacco Status:  She  reports that she has never smoked. She has never used smokeless tobacco.    Review of Systems:  No fever.  No rash. All other systems negative except as noted above.    Objective:    /68   Pulse (!) 107   Temp 98.4  F (36.9  C) (Oral)   Resp 28   Ht 5' 7\" (1.702 m)   Wt 182 lb (82.6 kg)   LMP 03/18/2021 (Approximate)   SpO2 97%   Breastfeeding No   BMI 28.51 kg/m    GENERAL: No acute distress.  Mood: low, yet anxious  Affect: flat  Eye contact: fair    Reviewed lab results, griffin vit D      "

## 2021-06-17 NOTE — TELEPHONE ENCOUNTER
Pt returned call. Provider msg relayed to pt. She said she tried to apply but didn't really understand it and the process. She's asking if she can get help from our [facility] to assist her in applying. She may need referral to care management. She doesn't know if she'll qualify and he dad's insurance doesn't cover a lot of things and she really wants her own ins.

## 2021-06-17 NOTE — PROGRESS NOTES
The clinic Community Health Worker talked with the patient today at the request of the PCP to discuss possible Clinic Care Coordination enrollment.  The service was described to the patient and immediate needs were discussed.  The patient declined enrollement at this time.  The PCP is encouraged to refer in the future if the patient's needs change.     - She reports that her parents currently claim her on their taxes and as a dependent of her parents, their income effects her eligibility for [place]/M[place]are coverage.

## 2021-06-17 NOTE — PROGRESS NOTES
Community Health Worker attempted to reach the patient for a second time and left a message for the patient.  If the patient is returning my call, please transfer the patient to [employer] at ext. [phone].       Patient has been mailed a unreachable letter and was provided with CHW contact information if they are interested in accessing Clinic Care Coordination.      Order for Care Management has been closed, no further outreach will be done at this time and patient can be re-referred.

## 2021-06-17 NOTE — PROGRESS NOTES
OFFICE VISIT NOTE      Assessment & Plan   Dena Chavez is a 21 y.o. female with a wart on her foot - shaved and treated with liquid nitrogen today.  Continue to treat until it is gone.    Mood - stable/better. Continue to monitor. Get good sleep, get exercise to help.    Financial aid - I gave her papers to complete to apply for aid but we'll have to let her know where to send/fax those.      Diagnoses and all orders for this visit:    Moderate major depression (H)    Anxiety    Other insomnia    Adjustment disorder with anxious mood    Overweight    Plantar wart of right foot    Other orders  -     Cancel: Chlamydia trachomatis & Neisseria gonorrhoeae, Amplified Detection        Kamryn Gabriel MD            Subjective:   Chief Complaint:  Follow-up (wart )    21 y.o. female.     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 No struggles with low mood, suicidal thoughts    2) she expects to get some forms to complete in order to get financial aid with medical bills --since she cannot get Medical assistance since her parents claim her on their taxes. She is on her dad's insurance and since he is self-employed, the insurance is very minimal.    3) wart - it did not shed/peel. There is no pain with walking on it. She wants another treatment.    Current Outpatient Medications   Medication Sig     cholecalciferol, vitamin D3, 125 mcg (5,000 unit) capsule Take 1 capsule (5,000 Units total) by mouth daily.     hydrOXYzine HCl (ATARAX) 25 MG tablet Take 1 tablet (25 mg total) by mouth 3 (three) times a day as needed for itching. Or for anxiety     propranoloL (INDERAL) 20 MG tablet Take 1 tablet (20 mg total) by mouth 3 (three) times a day as needed (for anxiety). (Patient not taking: Reported on 5/18/2021)       PSFHx: appropriate sections of PMH completed/filled in   Tobacco Status:  She  reports that she has never smoked. She has never used smokeless  "tobacco.    Review of Systems:  No fever.  No rash. All other systems negative except as noted above.    Objective:    /70   Pulse 94   Temp 98.2  F (36.8  C) (Oral)   Resp 20   Ht 5' 7\" (1.702 m)   Wt 180 lb (81.6 kg)   LMP 05/18/2021 (Exact Date)   SpO2 99%   Breastfeeding No   BMI 28.19 kg/m    GENERAL: No acute distress.  Foot: on the sole is a dark spot about 6mm in diameter and a callus ring around it. With her permission I shaved down the callus and removed the dark spot then did freeze - thaw x 3 treatments. Then I applied a bandaid. No complications.      "

## 2021-06-17 NOTE — PROGRESS NOTES
Clinic Care Coordination Contact  [facility]/Voicemail      Clinical Data: CHW Outreach    Outreach attempted x 1 regarding CCC enrollment.  Left message on patient's voicemail with call back information and requested return call. Left phone number for [facility] Billing Department [phone] and encouraged the patient to call them to find out their current best methods to send the completed application and documents.     Plan: CHW will attempt another outreach to the patient via phone regarding enrollment into CCC.

## 2021-06-17 NOTE — PROGRESS NOTES
"OFFICE VISIT NOTE      Assessment & Plan   Dena Chavez is a 21 y.o. female with known long-standing anxiety and a wart. We did treatment x 2 on the wart and she'll f/u in 3-4 weeks. I am hopeful we'll treat this completely in a few more freezings.    Anxiety remains - I strongly encouraged her to keep up with the exercising she's done, varying what she does at times. Not on selective serotonin reuptake inhibitor due to side effects from starting it.    Bad taste in mouth - no anemia on last blood tests; try antacids and follow. I reassured her that this is not likely dangerous. It could possibly be the sign of an infection, but we'll see.      Diagnoses and all orders for this visit:    Adjustment disorder with anxious mood    Moderate major depression (H)    Common wart    Bad taste in mouth        Kamryn Gabriel MD    30 minutes spent on the date of the encounter doing chart review, history and exam, documentation and further activities per the note        Subjective:   Chief Complaint:  Plantar Warts (2nd treatnet)    21 y.o. female.     1) wart on left foot - nothing ever fell off after the first treatment; it's not been painful even though she's been exercising.    2) after running, I get an \"iron taste\" in my mouth sometimes. It scared me so I stopped running. Initially it was only very short but it started lasting longer. It scares me.    3) mood is OK, not worse    Current Outpatient Medications   Medication Sig     cholecalciferol, vitamin D3, 125 mcg (5,000 unit) capsule Take 1 capsule (5,000 Units total) by mouth daily.     hydrOXYzine HCl (ATARAX) 25 MG tablet Take 1 tablet (25 mg total) by mouth 3 (three) times a day as needed for itching. Or for anxiety     propranoloL (INDERAL) 20 MG tablet Take 1 tablet (20 mg total) by mouth 3 (three) times a day as needed (for anxiety).       PSFHx: appropriate sections of PMH completed/filled in   Tobacco Status:  She  reports that she has never smoked. " "She has never used smokeless tobacco.    Review of Systems:  No fever.  No rash. All other systems negative except as noted above.    Objective:    /80   Pulse 97   Temp 98.2  F (36.8  C) (Oral)   Resp 17   Ht 5' 7\" (1.702 m)   Wt 182 lb 4 oz (82.7 kg)   LMP 04/15/2021 (Approximate)   SpO2 99%   BMI 28.54 kg/m    GENERAL: No acute distress.  Ht: reg s1s2  Lungs: clear with good aeration, no wheezes  Skin: left sole has a 1cm in diameter bloody-surfaced wart; treated with freeze-thaw x 3    Mood: fair  Affect: slightly more animated    "

## 2021-06-18 NOTE — LETTER
Letter by Kamryn Gabriel MD at      Author: Kamryn Gabriel MD Service: -- Author Type: --    Filed:  Encounter Date: 1/22/2019 Status: (Other)       January 22, 2019     Patient: Dena Dominguez   YOB: 2000   Date of Visit: 1/22/2019       To Whom it May Concern:    Dena Dominguez was seen in my clinic on 1/22/2019.    If you have any questions or concerns, please don't hesitate to call.    Sincerely,         Electronically signed by Kamryn Gabriel MD

## 2021-06-19 NOTE — LETTER
Letter by Kamryn Gabriel MD at      Author: Kamryn Gabriel MD Service: -- Author Type: --    Filed:  Encounter Date: 11/6/2019 Status: Signed       November 20, 2019    Dear Dena Chavez:    On review of your records, we have found a gap in your health care services.  Based on your age and health history, we recommend the following service/s:  - Annual physical exam   - Depression check  - Chlamydia screening     Please call us at [phone] to make an appointment for the above service/s.  If you had had the service done elsewhere, please let us know so that we can update our records.  If you have transferred care to another clinic, please let us know so that we can remove you from our list of current patients.   We believe that a strong preventive health program that includes regular visits and follow up care is an important part of your health and are committed to assisting you in being as healthy as you can be.     Sincerely,     [facility]

## 2021-06-20 NOTE — LETTER
Letter by Ami Paige RN at      Author: Ami Paige RN Service: -- Author Type: --    Filed:  Encounter Date: 6/12/2020 Status: (Other)       6/12/2020        Dena Chavez  [address] [address]    This letter provides a written record that you were tested for COVID-19 on 6/10/2020.     Your result was negative.    This means that we didnt find the virus that causes COVID-19 in your sample. A test may show negative when you do actually have the virus. This can happen when the virus is in the early stages of infection, before you feel illness symptoms.    Even if you dont have symptoms, they may still appear. For safety, its very important to follow these rules.    Keep yourself away from others (self-isolation):      Stay home. Dont go to work, school or anywhere else.     Stay in your own room (and use your own bathroom), if you can.    Stay away from others in your home. No hugging, kissing or shaking hands. No visitors.    Clean high touch surfaces often (doorknobs, counters, handles, etc.). Use a household cleaning spray or wipes.    Cover your mouth and nose with a mask, tissue or washcloth to avoid spreading germs.    Wash your hands and face often with soap and water.    Stay in self-isolation until you meet ALL of the guidelines below:    1. You have had no fever for at least 72 hours (that is 3 full days of no fever without the use of medicine that reduces fevers), AND  2. other symptoms (such as cough, shortness of breath) have gotten better, AND  3. at least 10 days have passed since your symptoms first appeared.    Going back to work  Check with your employer for any guidelines to follow for going back to work.    Employers: This document serves as formal notice that your employee tested negative for COVID-19, as of the testing date shown above.    For questions regarding this letter or your Negative COVID-19 result, call [phone] between 8A to 6:30P (M-F) and 10A  to 6:30P (weekends).

## 2021-06-21 NOTE — LETTER
Letter by Umu Mistry CHW at      Author: Umu Mistry CHW Service: -- Author Type: --    Filed:  Encounter Date: 5/19/2021 Status: (Other)       CARE COORDINATION  [address], [address]  [place] [address]    May 20, 2021    Dena Chavez  [address] [facility] [address]      Dear Dena,    I am a clinic community health worker who works with Kamryn Gabriel MD at [facility]. Your provider referred you to Clinic Care Coordination regarding support with completing a [employer] Financial Aid Application. If you feel you need support with this application please let us know. You may also contact the billing department directly at [phone] and they will be able to provide you with their most up-to-date guidelines around applying for financial aid. If you are approved for financial aid, you will need to re-apply through the billing department every 6 months. Below is a description of clinic care coordination and how I can further assist you.      The clinic care coordination team is made up of a registered nurse,  and community health worker who understand the health care system. The goal of clinic care coordination is to help you manage your health and improve access to the health care system in the most efficient manner. The team can assist you in meeting your health care goals by providing education, coordinating services, strengthening the communication among your providers and supporting you with any resource needs.    Please feel free to contact me at [phone] with any questions or concerns. We are focused on providing you with the highest-quality healthcare experience possible and that all starts with you.     Sincerely,     Umu Mistry  Community Health Worker

## 2021-06-23 NOTE — PROGRESS NOTES
OFFICE VISIT NOTE      Assessment & Plan   Dena Chavez is a 18 y.o. female with depression and anxiety - -likely exacerbated by a lot of change that's gone on for her in the past year PLUS this is her senior year of high school.  She's improved by her own report and by my assessment. This is a great start. She requests more med to take with panic, the hydroxyzine. I agreed to prescribe more of that. She agreed to try counseling and I urged her to go into it with an idea of what she wants out of it (like how to de-escalate a panic attack without medications). We will not change any med doses at this point. If she establishes care with a counselor then I will see her less. Otherwise, f/u in a month or two.  I strongly urged Patricio to make a strong effort to pass math so she can have a break at spring break AND a summer without schoolwork to do. She seemed to take this to heart.  I strongly pushed her to be more active.    Diagnoses and all orders for this visit:    Adjustment disorder with anxious mood    Panic attack    Overweight    Stress due to family tension        Kamryn Gabriel MD    The following are part of a depression follow up plan for the patient:  implementation of measures to provide psychological support, coping support assessment and coping support management          Subjective:   Chief Complaint:  Follow-up (medication)    18 y.o. female.     1) Patricio is here with her mother, in follow up. Patricio states she is doing a bit better. She is still struggling with math but other grades are better. She is sleeping a bit better. She thinks the medication is helping her. She has not started with a counselor, but her mom notes they like a counselor that MelidaPatricio's sister sees. It is easy to have Patricio see her, too. Patricio admits she has not been exercising at all, mainly due to the weather.    2) she still lives with her dad. Mom now has a place to live. Patricio states she feels safe at school and at her dad's  "home. She notes he just bought a house with a swimming pool. They will move there in March and she is looking forward to that.     Current Outpatient Medications   Medication Sig     buPROPion (WELLBUTRIN XL) 150 MG 24 hr tablet Take 1 tablet (150 mg total) by mouth daily.     cholecalciferol, vitamin D3, 5,000 unit capsule Take 1 capsule (5,000 Units total) by mouth daily.     hydrOXYzine HCl (ATARAX) 25 MG tablet Take 1 tablet (25 mg total) by mouth 3 (three) times a day as needed for itching. Or for anxiety     penicillin VK (PEN VK) 500 MG tablet Take 1 tablet twice daily     zolpidem (AMBIEN CR) 12.5 MG CR tablet Take 1 tablet (12.5 mg total) by mouth at bedtime as needed for sleep.       PSFHx: appropriate sections of PMH completed/filled in   Tobacco Status:  She  reports that  has never smoked. she has never used smokeless tobacco.    Review of Systems:  No fever.  No rash. All other systems negative except as noted above.    Objective:    /68   Pulse 85   Temp 98.5  F (36.9  C) (Oral)   Resp 12   Ht 5' 8.11\" (1.73 m)   Wt 180 lb (81.6 kg)   LMP 01/21/2019 (Exact Date)   SpO2 97%   BMI 27.28 kg/m    GENERAL: No acute distress.  Mood: fair  Insight: fair to good  Judgment: fair  Affect: flat    Spent 40 min face to face with patient with more the 50% spent in counseling, reviewing chart, and coordination of care and discussing depression, anxiety, panic and what to do for it, meds, counseling.        "

## 2021-06-23 NOTE — TELEPHONE ENCOUNTER
Please call Dena (best to reach her off of school hours). Let her know her labs are mostly normal, just her vitamin D level is low. Since this can affect her mood and make her less healthy, I recommend she take a high-dose vitamin D supplement daily for the next year. After that she should keep taking a supplement, but instead of 5000iu daily she can do 2000iu.  I have ordered the vit D for her, but it will likely not be covered on her current insurance --but it WILL be covered by [facility] which starts 2/1/19. Thus, I recommend she wait to get it at the pharmacy until after 2/1/19.    I will tell more detail about her results at her next visit

## 2021-06-23 NOTE — TELEPHONE ENCOUNTER
Left message x 1. Please review notes below and advise and/or schedule the patient as written or indicated. CMT will contact the patient 3 times and then re-route to the provider for advising on how to procced if CMT unable to reach the patient.

## 2021-06-23 NOTE — TELEPHONE ENCOUNTER
Please see denial of PA below and meds that ARE COVERED and order one of them at PCP discretion.  Thanks.

## 2021-06-23 NOTE — PROGRESS NOTES
OFFICE VISIT NOTE      Assessment & Plan   Dena Dominguez is a 18 y.o. female with newly developed depression and anxiety which could be results of dramatic changes in the past 6 months. She was recently in the ER for pain behind her left eye - which they only did a CT for = negative. However, the depression and anxiety run in the family. She is not suicidal or unsafe/being abused at least by her report today. Her health insurance will likely change from what it is now to [place] sure as of Feb 1st.     She had good insights - she's not only switched to living with her dad. She's also adjusted to not having her next older sister around (Melida who is 1 year older) or her mom around. Her best friend attends a different school, and she has liked softball in the past but the  is mean so she won't do it this year. There is a lot on her shoulders - worries about a job, about her classes, not sleeping - she is anxious but not (yet) going anywhere with that.    Today - discussed healthy living - getting good, regular meals, sleep and exercise. I encouraged her to go out for a spring sport- she used to do softball but the  is mean.    Start Wellbutrin 150mg daily (this is what I found her sister is on).  Hydroxyzine for panic attacks  ambien to set a better sleep schedule (take regularly x 10 days then prn). Make a sleep routine!  F/u in 2 weeks.    Diagnoses and all orders for this visit:    MIRYAM (generalized anxiety disorder)  -     HM1(CBC and Differential)  -     Comprehensive Metabolic Panel  -     Thyroid Stimulating Hormone (TSH)  -     Lipid Cascade RANDOM  -     cholecalciferol, vitamin D3, 5,000 unit capsule  Dispense: 90 capsule; Refill: 4  -     Vitamin D, Total (25-Hydroxy)  -     HM1 (CBC with Diff)  -     Mononucleosis Screen    Adjustment disorder with anxious mood  -     Mononucleosis Screen    Other orders  -     buPROPion (WELLBUTRIN XL) 150 MG 24 hr tablet  Dispense: 30 tablet; Refill: 6  -      "hydrOXYzine HCl (ATARAX) 25 MG tablet  Dispense: 50 tablet; Refill: 1  -     zolpidem (AMBIEN CR) 12.5 MG CR tablet  Dispense: 30 tablet; Refill: 0    she needs to have her vision checked! She admits to not wearing her glasses, needing them, and having pain behind her left eye.    Kamryn Gabriel MD    The following are part of a depression follow up plan for the patient:  coping support assessment and coping support management          Subjective:   Chief Complaint:  Follow-up (anxiety, pressure on face, ear ache ); Shaking; and Palpitations    18 y.o. female.     1) Saturday - pain behind eye (again) - this time with left face feeling nub and right arm and leg felt numb - could move the  Prior to that was in urgent care a few days before    The pain in her eye  - worries her. She thinks there is something more. Worrying keeps her up at night. She denies worrying she'll die.    Is only eating once daily      2) lately anxiety is over the top  Cannot sleep  Not functioning - crying, has a new job and does not want to go  Sister has brought this up as she and her mom have anxiety issues, too (parents are )    Is a senior in high school, dad wants her to graduate  Attends [employer] - is currrently on track to graduate    Job - at [employer]. Just started this weekend    3) other than having her tonsils out 2 years ago, she does not usually go to the doctor    [facility] was bad, \"why are you here\"?  They reluctantly did a CT    Dad - lots of change in the past 6 months - mom lost her home. Dad took Patricio in and he says he has custody.  Dad has purchased a home. She has to drive from [place] to [facility].  Last trimester grades were good.  Patricio sometimes worries about her mom. 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  Feb 1st [place]'ll be on [place] -     Melida - next older sister  Danuta - 24    Usually has done softball but the  is mean -     Best " "friend does not go to same school  Good friend attends [employer]    No suicide  Has glasses    Current Outpatient Medications   Medication Sig     buPROPion (WELLBUTRIN XL) 150 MG 24 hr tablet Take 1 tablet (150 mg total) by mouth daily.     cholecalciferol, vitamin D3, 5,000 unit capsule Take 1 capsule (5,000 Units total) by mouth daily.     hydrOXYzine HCl (ATARAX) 25 MG tablet Take 1 tablet (25 mg total) by mouth 3 (three) times a day as needed for itching. Or for anxiety     penicillin VK (PEN VK) 500 MG tablet Take 1 tablet twice daily     zolpidem (AMBIEN CR) 12.5 MG CR tablet Take 1 tablet (12.5 mg total) by mouth at bedtime as needed for sleep.       PSFHx: appropriate sections of PMH completed/filled in   Tobacco Status:  She  reports that  has never smoked. she has never used smokeless tobacco.    Review of Systems:  No fever.  No rash. All other systems negative except as noted above.    Objective:    /70   Pulse 103   Temp 99.2  F (37.3  C) (Oral)   Resp 12   Ht 5' 8.11\" (1.73 m)   Wt 183 lb (83 kg)   LMP 01/21/2019 (Exact Date)   SpO2 99%   BMI 27.74 kg/m    GENERAL: No acute distress.  Affect: flat  Insight: good  Judgment: good  Mood: sad    No other exam done  Labs pending    Spent 60 min face to face with patient with more the 50% spent in counseling, reviewing chart, and coordination of care and discussing depression, adjustment, anxiety, panic, sleep, nutrition, activity, counseling.        "

## 2021-06-23 NOTE — TELEPHONE ENCOUNTER
Prior Authorization Request  Who s requesting:  Pharmacy  Pharmacy Name and Location: [facility] pharmacy   Medication Name: Zolpidem 12.5 mg tab   Insurance Plan: [employer]   Insurance Member ID Number:  [id number]  Informed patient that prior authorizations can take up to 10 business days for response:   No  Okay to leave a detailed message: Yes

## 2021-06-23 NOTE — TELEPHONE ENCOUNTER
Left message x 2. Please review notes below and advise and/or schedule the patient as written or indicated. CMT will contact the patient 3 times and then re-route to the provider for advising on how to procced if CMT unable to reach the patient.

## 2021-06-24 NOTE — PROGRESS NOTES
"OFFICE VISIT NOTE      Assessment & Plan   Dena Chavez is a 18 y.o. female who I think has psychosomatic complaints today. There was nothing wrong in places she feels pain or problems. I am unsure she's telling the truth about taking her Wellbutrin and he is openly hesitant about taking hydroxyzine for panic or for sleep problems. She is also hesitant to take tylenol for other pain. I really think she needs counseling but it seems she and her family have not taken steps to make that happen. Given her mom was evicted, I'm betting she's not following through like she said she would.  Dad fell through on his \"promise\" of a home to move into in March. I hope we can provide some supports to help Dena navigate into adulthood - work and such. I'll see if I can assist with getting counseling going. I'll see her a bit more until that is established.    We talked about making strong efforts in math, to pass. She said she arranged for extra help after school tomorrow - which is super.  F/u 3/20    Diagnoses and all orders for this visit:    Paresthesias    Stress due to family tension    Overweight    Panic attack    Adjustment disorder with anxious mood    Other insomnia        Kamryn Gabriel MD    The following are part of a depression follow up plan for the patient:  coping support assessment and coping support management          Subjective:   Chief Complaint:  Pain (pain on right side hip/buttocks)    18 y.o. female.     1) \"right buttcks hurts\"  Sometimes tight into upper thigh  No falls  Hurts to sit (at school)    Last year (Oct 2018) she had a \"stick and poke\" tattoo done. She's worried about it - does she have an infection from it?  Would like to have it removed.    2) sleep - wakes a lot at night, is always tired because she cannot sleep well. She does not take anything to sleep because she does not like to take pills    3) she's living with her dad at her grandma's house, which is stressful  Has own " "bedroom but does not feel like she has privacy or space. Previously, her dad had said they would move into his new home in March, now the date is uncertain because he's remodeling. Dena has no idea when the move will happen    4) math - she's needing extra help and will stay after school a few days this week to get help - finals are next week. She is unsure if she will pass.  5) mood - she feels worried; she says she's taking the wellbutrin. Nothing has happened to get her counseling.       Current Outpatient Medications   Medication Sig     buPROPion (WELLBUTRIN XL) 150 MG 24 hr tablet Take 1 tablet (150 mg total) by mouth daily.     cholecalciferol, vitamin D3, 5,000 unit capsule Take 1 capsule (5,000 Units total) by mouth daily.     hydrOXYzine HCl (ATARAX) 25 MG tablet Take 1 tablet (25 mg total) by mouth 3 (three) times a day as needed for itching. Or for anxiety     penicillin VK (PEN VK) 500 MG tablet Take 1 tablet twice daily     zolpidem (AMBIEN CR) 12.5 MG CR tablet Take 1 tablet (12.5 mg total) by mouth at bedtime as needed for sleep.       PSFHx: appropriate sections of PMH completed/filled in   Tobacco Status:  She  reports that  has never smoked. she has never used smokeless tobacco.    Review of Systems:  No fever. Front of right ankle is sensitive - if you touch it, she feels it down onto her foot/toes. All other systems negative except as noted above.    Objective:    /68   Pulse 100   Temp 99  F (37.2  C) (Oral)   Resp 12   Ht 5' 6.54\" (1.69 m)   Wt 173 lb 1.9 oz (78.5 kg)   LMP 02/20/2019 (Approximate)   BMI 27.49 kg/m    GENERAL: No acute distress.  Mood: low, anxious  Insight: poor  Judgment: fair  Affect: flat    Skin: tattoo on right hip is at most 1cm wide, there is no swelling or erythema around it. With palpation, there is no tenderness directly on it  Buttocks - muscles are nontender, not tight, no knots but some tenderness on the upper and lateral areas  Right ankle: no " rash, erythema, bruising; foot/toes move normally    Spent 40 min face to face with patient with more the 50% spent in counseling, reviewing chart, and coordination of care and discussing anxiety, mood, pain, sleep, social situation.

## 2021-06-25 NOTE — PROGRESS NOTES
OFFICE VISIT NOTE      Assessment & Plan   Dena Chavez is a 19 y.o. female with depression and adjustment disorder. She was living with her mom in [place] but her mom was evicted and thus Dena was forced to move in with her dad. While Dena feels safe at that home, it is not very nurturing.    Dena is aware of an initial appointment with a counselor here. She thinks she will come to it. I encouraged her to plan to have at least 3 visits and see how it goes. Counseling is an effective means of support for her struggles.    Medication - I strongly advised Dena to remain on the Wellbutrin. She does not want to increase the dose, but agreed to keep taking it daily.    Sleep/exercise/nutrition - I emphasized the importance of these.  Academics - I strongly advised she stay on top of classes so she can be finished with high school and not have make up classes.    Diagnoses and all orders for this visit:    Adjustment disorder with anxious mood  -     buPROPion (WELLBUTRIN XL) 150 MG 24 hr tablet  Dispense: 90 tablet; Refill: 4    Panic attack  -     buPROPion (WELLBUTRIN XL) 150 MG 24 hr tablet  Dispense: 90 tablet; Refill: 4    Stress due to family tension    Overweight    Other insomnia        Kamryn Gabriel MD    The following are part of a depression follow up plan for the patient:  under care of mental health counselor, coping support assessment and coping support management  The following high BMI interventions were performed this visit: encouragement to exercise          Subjective:   Chief Complaint:  Follow-up (anxiety)    19 y.o. female.     1) doing the same - not worse, not better.  Still lives with her dad and grandma. Did nothing but more work (at [employer]) over spring break.    When asked who she is most in touch with on a daily basis, who asks her how she's doing, she says no one. Later, when asked specifically, she says her Dad asks her, but she does not want to answer  "him.    2) does not want to take meds - says she is taking Wellbutrin. Has not taken hydroxyzine since it was initially prescribed.    3) school is OK - it is her last \"trimester\" of high school, assuming she passes. She got a D- in math last trimester.    4) sleep is now OK. No med used.    5) moving - her dad bought a house which she looks forward to moving into, but the timeline is completely unclear. If Dena asks him about when they will move, she gets yelled at and a guilt trip from her dad. Thus, she does not ask/does not know.    Current Outpatient Medications   Medication Sig     buPROPion (WELLBUTRIN XL) 150 MG 24 hr tablet Take 1 tablet (150 mg total) by mouth daily.     cholecalciferol, vitamin D3, 5,000 unit capsule Take 1 capsule (5,000 Units total) by mouth daily.     hydrOXYzine HCl (ATARAX) 25 MG tablet Take 1 tablet (25 mg total) by mouth 3 (three) times a day as needed for itching. Or for anxiety       PSFHx: appropriate sections of PMH completed/filled in   Tobacco Status:  She  reports that  has never smoked. she has never used smokeless tobacco.    Review of Systems:  No fever.  No rash. All other systems negative except as noted above.    Objective:    /78   Pulse 98   Temp 98.8  F (37.1  C) (Oral)   Resp 12   Ht 5' 6.54\" (1.69 m)   Wt 174 lb 4 oz (79 kg)   LMP 03/18/2019 (Exact Date)   SpO2 99%   BMI 27.67 kg/m    GENERAL: No acute distress, answers questions but does not volunteer much information, withdrawn  Mood: low  Insight: fair  Judgment: poor to fair  Affect: flat    Spent 40 min face to face with patient with more the 50% spent in counseling, reviewing chart, and coordination of care and discussing counseling, medication, school, planning, sleep, mood.    "

## 2021-06-25 NOTE — TELEPHONE ENCOUNTER
Encounter Date: 9/25/2017    SCRIBE #1 NOTE: I, Cuba Schwab, am scribing for, and in the presence of,  Dr. Escalante. I have scribed the following portions of the note - the Resident attestation.       History     Chief Complaint   Patient presents with    Suicidal     'addicted to heroin, thinking about hurting myself, no plan, now heroin use since friday     28 yo male w/ hx of heroine abuse presents accompanied for his mother for evaluation of suicidal thoughts. Pt reports wanting to kill himself w/ a gun but he has no access to a gun. No prior suicide attempts. Reports only feeling depressed and suicidal when he is not doing heroine. Last heroine use was this morning. Normally injects heroine. No prior psych hx or psych medication. Pt mentions recent stressors of missed court date, theft charges, and recent father death one year ago as added stressors. Pt spent the weekend on the streets because his mother kicked him out of the house. Mother reports him having suicidal thoughts.           Review of patient's allergies indicates:  No Known Allergies  History reviewed. No pertinent past medical history.  Past Surgical History:   Procedure Laterality Date    KNEE SURGERY       No family history on file.  Social History   Substance Use Topics    Smoking status: Current Every Day Smoker     Types: Cigarettes    Smokeless tobacco: Never Used    Alcohol use No     Review of Systems   Constitutional: Negative for fever.   HENT: Negative for sore throat.    Respiratory: Negative for shortness of breath.    Cardiovascular: Negative for chest pain.   Gastrointestinal: Positive for nausea.   Genitourinary: Negative for dysuria.   Musculoskeletal: Positive for myalgias.   Skin: Negative for rash.   Neurological: Negative for weakness.   Hematological: Does not bruise/bleed easily.   Psychiatric/Behavioral: Positive for suicidal ideas. Negative for hallucinations.       Physical Exam     Initial Vitals [09/25/17 1659]  Reason for Call:  Other call back      Detailed comments: Pt requesting callback from PCP today. Please see [employer] messages that were sent today as well. Thank you.    Phone Number Patient can be reached at: Cell number on file:    Telephone Information:   Mobile [phone]       Best Time: anytime    Can we leave a detailed message on this number?: Yes    Call taken on 6/15/2021 at 2:35 PM by Price Wood         BP Pulse Resp Temp SpO2   126/69 82 18 97.7 °F (36.5 °C) 99 %      MAP       88         Physical Exam    Nursing note and vitals reviewed.  Constitutional: He appears well-developed and well-nourished. No distress.   HENT:   Mouth/Throat: Oropharynx is clear and moist.   Eyes: EOM are normal. Pupils are equal, round, and reactive to light.   Cardiovascular: Normal rate, regular rhythm and normal heart sounds.   No murmur heard.  Pulmonary/Chest: Breath sounds normal. No respiratory distress. He has no wheezes. He has no rales.   Abdominal: Soft. Bowel sounds are normal.   Neurological: He is alert and oriented to person, place, and time.   Skin:   Track marks of the RUE.    Psychiatric: His behavior is normal. Thought content normal.         ED Course   Procedures  Labs Reviewed   COMPREHENSIVE METABOLIC PANEL - Abnormal; Notable for the following:        Result Value    Potassium 3.4 (*)     Glucose 119 (*)     All other components within normal limits    Narrative:     GREEN SHARED   URINALYSIS - Abnormal; Notable for the following:     Appearance, UA Hazy (*)     Protein, UA 2+ (*)     All other components within normal limits    Narrative:     YELLOW & GRAY TUBES   DRUG SCREEN PANEL, URINE EMERGENCY - Abnormal; Notable for the following:     Creatinine, Random Ur >450.0 (*)     All other components within normal limits    Narrative:     YELLOW & GRAY TUBES   ACETAMINOPHEN LEVEL - Abnormal; Notable for the following:     Acetaminophen (Tylenol), Serum <3.0 (*)     All other components within normal limits    Narrative:     GREEN SHARED   URINALYSIS MICROSCOPIC - Abnormal; Notable for the following:     Bacteria, UA Moderate (*)     Hyaline Casts, UA 44 (*)     All other components within normal limits    Narrative:     YELLOW & GRAY TUBES   CBC W/ AUTO DIFFERENTIAL    Narrative:     GREEN SHARED   TSH    Narrative:     GREEN SHARED   ALCOHOL,MEDICAL (ETHANOL)    Narrative:     GREEN SHARED             Medical  Decision Making:   History:   Old Medical Records: I decided to obtain old medical records.  Independently Interpreted Test(s):   I have ordered and independently interpreted EKG Reading(s) - see prior notes  Clinical Tests:   Lab Tests: Ordered and Reviewed  Medical Tests: Ordered and Reviewed       APC / Resident Notes:   26 yo male w/ heroine abuse BIB mother for evalution of suicidal thoughts. Last use this morning. No previous attempts. Plan to get gun and shoot himself. No AVH. No HI. VSS. NAD. RRR. LCTAB. Will perform normal psych w/u and consult psych. Pt PEC.    Pt started to get agitated requiring benzo, haldol, and benadryl. W/u unremarkable. Pending psych evaluation.        Scribe Attestation:   Scribe #1: I performed the above scribed service and the documentation accurately describes the services I performed. I attest to the accuracy of the note.    Attending Attestation:   Physician Attestation Statement for Resident:  As the supervising MD   Physician Attestation Statement: I have personally seen and examined this patient.   I agree with the above history. -: Pt claiming suicidal ideations, saying that he gets depressed after heroin use. Pt does have plan to shoot himself but no access to a gun. While in ED pt became boisterous to ED staff, yelling at them. Pt given a B52 shot and restrained. We are awaiting psych evaluation. Pt is medically cleared for evaluation.    As the supervising MD I agree with the above PE.    As the supervising MD I agree with the above treatment, course, plan, and disposition.  I have reviewed and agree with the residents interpretation of the following: lab data and EKG.          Physician Attestation for Scribe:  Physician Attestation Statement for Scribe #1: I, Dr. Cabrera, reviewed documentation, as scribed by Cuba Schwab in my presence, and it is both accurate and complete.         Attending ED Notes:   Patient is medically cleared and can be sent to [facility] for  further observation          ED Course      Clinical Impression:   There were no encounter diagnoses.    Disposition:   Disposition: Other  Condition: Serious                        Kun Drake MD  09/25/17 3747

## 2021-06-25 NOTE — TELEPHONE ENCOUNTER
Fax received from [facility], they have started the Prior Authorization Process via Cover My Meds    CoverMyMeds Key: [id number]    Medication Name: Bupropion XL 150mg tablets    Insurance Plan: BCBS  PBM:   Patient ID: not provided on fax    Please complete the PA process

## 2021-06-26 NOTE — PATIENT INSTRUCTIONS - HE
Dear Dena Chavez    It sounds like the wart somehow became infected. It is possible that as the dead skin came off, some bacteria got in under the skin. I would like you to soak your foot in water twice daily for 3-4 days while taking an antibiotic. This should help the problem to resolve. Let me know if this treatment helps or not. It would have helped to see your foot to know how bad the infection is, but at least getting treatment going should make it improve. If you develop a fever, then you need to go to the emergency room.    Thanks for choosing us as your health care partner,    Kamryn Gabriel

## 2021-06-26 NOTE — PROGRESS NOTES
OFFICE VISIT NOTE      Assessment & Plan   Dena Chavez is a 21 y.o. female here for wart treatment and some psychological follow up.    Wart is decreasing in size, continued treatment given and needed to continue. Since she has [employer] to help with billing, she is willing to return for continued treatment and set up counseling.    Diagnoses and all orders for this visit:    Other viral warts    Moderate major depression (H)  -     AMB REFERRAL TO MENTAL HEALTH AND ADDICTION  - Adult (18+); Outpatient Treatment; Individual/Couples/Family/Group Therapy/Health Psychology; [facility]; [facility]; We will contact you to schedule the appointment or please c...    Anxiety  -     AMB REFERRAL TO MENTAL HEALTH AND ADDICTION  - Adult (18+); Outpatient Treatment; Individual/Couples/Family/Group Therapy/[facility]; [facility]; [facility]; We will contact you to schedule the appointment or please c...    Other insomnia  -     AMB REFERRAL TO MENTAL HEALTH AND ADDICTION  - Adult (18+); Outpatient Treatment; Individual/Couples/Family/Group Therapy/Health Psychology; [facility]; [facility]; We will contact you to schedule the appointment or please c...    Adjustment disorder with anxious mood  -     AMB REFERRAL TO MENTAL HEALTH AND ADDICTION  - Adult (18+); Outpatient Treatment; Individual/Couples/Family/Group Therapy/Health Psychology; [facility]; [facility]; We will contact you to schedule the appointment or please c...    Overweight  -     AMB REFERRAL TO MENTAL HEALTH AND ADDICTION  - Adult (18+); Outpatient Treatment; Individual/Couples/Family/Group Therapy/Health Psychology; [facility]; [facility]; We will contact you to schedule the appointment or please c...    Financial problems  -     AMB REFERRAL TO MENTAL HEALTH AND ADDICTION  - Adult (18+); Outpatient Treatment;  "Individual/Couples/Family/Group Therapy/Health Psychology; [facility]; [facility]; We will contact you to schedule the appointment or please c...        Kamryn Gabriel MD    30 minutes spent on the date of the encounter doing chart review, history and exam, documentation and further activities per the note        Subjective:   Chief Complaint:  Follow Up    21 y.o. female.     1) wart - not hurting, wants it to go away; agrees to continue treatment    2) found out the [facility] will help her pay for expenses so she can get the wart treatment and counseling. They will even help with the copay. She is thankful and now willing to schedule more appointments.    Current Outpatient Medications   Medication Sig     cholecalciferol, vitamin D3, 125 mcg (5,000 unit) capsule Take 1 capsule (5,000 Units total) by mouth daily.     hydrOXYzine HCl (ATARAX) 25 MG tablet Take 1 tablet (25 mg total) by mouth 3 (three) times a day as needed for itching. Or for anxiety     propranoloL (INDERAL) 20 MG tablet Take 1 tablet (20 mg total) by mouth 3 (three) times a day as needed (for anxiety). (Patient not taking: Reported on 5/18/2021)       PSFHx: appropriate sections of PMH completed/filled in   Tobacco Status:  She  reports that she has never smoked. She has never used smokeless tobacco.    Review of Systems:  No fever.  No rash. All other systems negative except as noted above.    Objective:    /68   Pulse 81   Temp 98.4  F (36.9  C) (Oral)   Resp 17   Ht 5' 7\" (1.702 m)   Wt 180 lb (81.6 kg)   LMP 05/18/2021 (Exact Date)   SpO2 99%   BMI 28.19 kg/m    GENERAL: No acute distress, soft spoken  Mood: low  Affect: flat  Foot: wart present, only protrudes about 1mm above the level of the surrounding skin and now has a smooth surface.  Treated with liquid nitrogen x 4      "

## 2021-07-03 NOTE — ADDENDUM NOTE
Addendum Note by Eduarda Gabriel MD at 2/26/2019  3:00 PM     Author: Eduarda Gabriel MD Service: -- Author Type: Physician    Filed: 2/27/2019  3:10 PM Encounter Date: 2/26/2019 Status: Signed    : Eduarda Gabriel MD (Physician)    Addended by: EDUARDA GABRIEL on: 2/27/2019 03:10 PM        Modules accepted: Orders

## 2021-07-04 NOTE — ADDENDUM NOTE
Addendum Note by Eduarda Gabriel MD at 5/18/2021 10:40 AM     Author: Eduarda Gabriel MD Service: -- Author Type: Physician    Filed: 5/19/2021  9:34 AM Encounter Date: 5/18/2021 Status: Signed    : Eduarda Gabriel MD (Physician)    Addended by: EDUARDA GABRIEL on: 5/19/2021 09:34 AM        Modules accepted: Orders

## 2021-08-31 ENCOUNTER — OFFICE VISIT (OUTPATIENT)
Dept: FAMILY MEDICINE | Facility: CLINIC | Age: 21
End: 2021-08-31
Payer: COMMERCIAL

## 2021-08-31 VITALS
HEIGHT: 67 IN | SYSTOLIC BLOOD PRESSURE: 94 MMHG | TEMPERATURE: 99.3 F | OXYGEN SATURATION: 99 % | BODY MASS INDEX: 29.78 KG/M2 | HEART RATE: 93 BPM | RESPIRATION RATE: 17 BRPM | WEIGHT: 189.75 LBS | DIASTOLIC BLOOD PRESSURE: 60 MMHG

## 2021-08-31 DIAGNOSIS — F41.9 ANXIETY: ICD-10-CM

## 2021-08-31 DIAGNOSIS — F43.22 ADJUSTMENT DISORDER WITH ANXIOUS MOOD: ICD-10-CM

## 2021-08-31 DIAGNOSIS — F32.1 MODERATE MAJOR DEPRESSION (H): ICD-10-CM

## 2021-08-31 DIAGNOSIS — E66.3 OVERWEIGHT: Primary | ICD-10-CM

## 2021-08-31 PROCEDURE — 99213 OFFICE O/P EST LOW 20 MIN: CPT | Performed by: FAMILY MEDICINE

## 2021-08-31 ASSESSMENT — MIFFLIN-ST. JEOR: SCORE: 1663.33

## 2021-08-31 NOTE — PROGRESS NOTES
"    Assessment & Plan     Overweight  Has gained some weight but also just joined a gym and started exercising. She's sore today    Anxiety  Much improved    Moderate major depression (H)  Stable and improved    Adjustment disorder with anxious mood  As above        20 minutes spent on the date of the encounter doing chart review, history and exam, documentation and further activities per the note       BMI:   Estimated body mass index is 29.72 kg/m  as calculated from the following:    Height as of this encounter: 1.702 m (5' 7\").    Weight as of this encounter: 86.1 kg (189 lb 12 oz).   Weight management plan: Discussed healthy diet and exercise guidelines    SELF MONITORING:       - for mood  Work on weight loss  Regular exercise    Return in about 6 months (around 2/28/2022) for Follow up.    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 With work being busy, she wants to see if it regrows or continues to shrink.    Right arm - upper arm. Went to the gym Sunday - did a newer work out. Woke Monday with pain in arm    Review of Systems         Objective    BP 94/60   Pulse 93   Temp 99.3  F (37.4  C) (Oral)   Resp 17   Ht 1.702 m (5' 7\")   Wt 86.1 kg (189 lb 12 oz)   LMP 08/14/2021 (Approximate)   SpO2 99%   BMI 29.72 kg/m    Body mass index is 29.72 kg/m .  Physical Exam   GENERAL: healthy, alert and no distress  NECK: no adenopathy, no asymmetry, masses, or scars and thyroid normal to palpation  RESP: lungs clear to auscultation - no rales, rhonchi or wheezes  CV: regular rate and rhythm, normal S1 S2, no murmur, no peripheral edema and peripheral pulses strong  ABDOMEN: soft, nontender, no " hepatosplenomegaly, no masses and bowel sounds normal  MS: no gross musculoskeletal defects noted, no edema; I palpated both upper arms in the triceps area and that muscle bilaterally palpated normal/intact but the right side was tender with palpation    Mood: fair to good  Affect: congruent    No results found for this or any previous visit (from the past 24 hour(s)).

## 2021-09-02 ENCOUNTER — MYC MEDICAL ADVICE (OUTPATIENT)
Dept: CARE COORDINATION | Facility: CLINIC | Age: 21
End: 2021-09-02

## 2021-09-03 ENCOUNTER — MYC MEDICAL ADVICE (OUTPATIENT)
Dept: FAMILY MEDICINE | Facility: CLINIC | Age: 21
End: 2021-09-03

## 2021-09-03 ENCOUNTER — PATIENT OUTREACH (OUTPATIENT)
Dept: CARE COORDINATION | Facility: CLINIC | Age: 21
End: 2021-09-03

## 2021-09-03 ENCOUNTER — MYC MEDICAL ADVICE (OUTPATIENT)
Dept: CARE COORDINATION | Facility: CLINIC | Age: 21
End: 2021-09-03

## 2021-09-03 DIAGNOSIS — Z59.71 HAS HEALTH INSURANCE WITH INADEQUATE COVERAGE OF HEALTH EXPENSES: Primary | ICD-10-CM

## 2021-09-03 DIAGNOSIS — Z59.9 FINANCIAL PROBLEMS: ICD-10-CM

## 2021-09-03 SDOH — ECONOMIC STABILITY - INCOME SECURITY: PROBLEM RELATED TO HOUSING AND ECONOMIC CIRCUMSTANCES, UNSPECIFIED: Z59.9

## 2021-09-03 NOTE — PROGRESS NOTES
Clinic Care Coordination Contact  [facility]/Voicemail      Clinical Data: CHW Outreach    Outreach attempted x 1 regarding CCC enrollment.  Left message on patient's voicemail with call back information and requested return call.    Plan: CHW will attempt another outreach to the patient via phone regarding enrollment into CCC.

## 2021-09-08 NOTE — PROGRESS NOTES
Clinic Care Coordination Contact  Community Health Worker Initial Outreach    CHW Initial Information Gathering:  Referral Source: PCP  Preferred Hospital: [facility]  [phone]  Preferred Urgent Care: [facility], [phone]  Current living arrangement:: I live in a private home  Type of residence:: Private home - stairs  Community Resources: None  Supplies used at home:: None  Equipment Currently Used at Home: none  Informal Support system:: Family  No PCP office visit in Past Year: No  Transportation means:: Regular car  CHW Additional Questions  If ED/Hospital discharge, follow-up appointment scheduled as recommended?: N/A  Medication changes made following ED/Hospital discharge?: N/A  MyChart active?: Yes  Patient sent Social Determinants of Health questionnaire?: Yes    Patient accepts CC: Yes. Patient scheduled for assessment with CCC KATE on Wednesday 9/15/2021 at 11am. Patient noted desire to discuss other possible concerns besides health insurance..     Clinic Care Coordination Contact  Financial Resource Worker Screening    [facility] Benefits  Is patient requesting help applying for [facility] benefits?: No    [place]  [place] No  Is this an insurance renewal?: No  Is this a new insurance application request?: Yes  Have you recently applied for insurance?: No  How many people in your household? : 1  Do you file taxes?: Yes  How many dependents do you claim?: 0  What is the monthly gross income for the household (wages, social security, workers comp, and pension)? : 1213 ($14/hr and works about 20 hours per week)    Any other information for the FRW?: Patient is living with her dad, but he will not be claiming her on his taxes when he files again in 2022. She would like to apply for health insurance through [facility].

## 2021-09-09 ENCOUNTER — PATIENT OUTREACH (OUTPATIENT)
Dept: CARE COORDINATION | Facility: CLINIC | Age: 21
End: 2021-09-09

## 2021-09-09 NOTE — PROGRESS NOTES
Clinic Care Coordination Contact  Program: Health Insurance and County Benefits  County: [facility] Case #:  [facility] Worker:   Jann #:   Subscriber #:   Renewal:  Date Applied:     FRW Outreach:  9/9/2021: Outreach attempted x 1. Left message on voicemail with call back information and requested return call.  Plan: FRW will call again within one week.       Health Insurance:    [facility]    Referral/Screening:    [facility] Benefits  Is patient requesting help applying for [facility] benefits?: No     [place]urance:  [place] No  Is this an insurance renewal?: No  Is this a new insurance application request?: Yes  Have you recently applied for insurance?: No  How many people in your household? : 1  Do you file taxes?: Yes  How many dependents do you claim?: 0  What is the monthly gross income for the household (wages, social security, workers comp, and pension)? : 1213 ($14/hr and works about 20 hours per week)     Any other information for the FRW?: Patient is living with her dad, but he will not be claiming her on his taxes when he files again in 2022. She would like to apply for health insurance through [employer].

## 2021-09-14 ENCOUNTER — PATIENT OUTREACH (OUTPATIENT)
Dept: CARE COORDINATION | Facility: CLINIC | Age: 21
End: 2021-09-14

## 2021-09-14 NOTE — PROGRESS NOTES
Clinic Care Coordination Contact  Program: Health Insurance and County Benefits  County: [facility] Case #:  [facility] Worker:   Jann #:   Subscriber #:   Renewal:  Date Applied:     FRW Outreach:  9/14/2021: Outreach attempted x 2. Left message on voicemail indicating last outreach attempt.   Plan: FRW will send an unreachable letter and remove from panel. Patient can be referred back to FRW.      9/9/2021: Outreach attempted x 1. Left message on voicemail with call back information and requested return call.  Plan: FRW will call again within one week.       Health Insurance:    [facility]    Referral/Screening:    [facility] Benefits  Is patient requesting help applying for county benefits?: No     [place]:  [place] No  Is this an insurance renewal?: No  Is this a new insurance application request?: Yes  Have you recently applied for insurance?: No  How many people in your household? : 1  Do you file taxes?: Yes  How many dependents do you claim?: 0  What is the monthly gross income for the household (wages, social security, workers comp, and pension)? : 1213 ($14/hr and works about 20 hours per week)     Any other information for the FRW?: Patient is living with her dad, but he will not be claiming her on his taxes when he files again in 2022. She would like to apply for health insurance through [employer].

## 2021-09-15 ENCOUNTER — MYC MEDICAL ADVICE (OUTPATIENT)
Dept: CARE COORDINATION | Facility: CLINIC | Age: 21
End: 2021-09-15

## 2021-09-15 ENCOUNTER — PATIENT OUTREACH (OUTPATIENT)
Dept: CARE COORDINATION | Facility: CLINIC | Age: 21
End: 2021-09-15

## 2021-09-15 NOTE — PROGRESS NOTES
Clinic Care Coordination Contact     Pt was a no show for initial CCC phone assessment x1. CHW to re-attempt outreach and offer to reschedule if needed. Of note, she has already been removed from FRW panel.    Previous no shows: 9/9/21 & 9/14/21 (w/ FRW)

## 2021-10-01 ENCOUNTER — VIRTUAL VISIT (OUTPATIENT)
Dept: FAMILY MEDICINE | Facility: CLINIC | Age: 21
End: 2021-10-01
Payer: COMMERCIAL

## 2021-10-01 DIAGNOSIS — R09.81 NASAL CONGESTION: Primary | ICD-10-CM

## 2021-10-01 DIAGNOSIS — R50.9 SUBJECTIVE FEVER: ICD-10-CM

## 2021-10-01 PROCEDURE — 99213 OFFICE O/P EST LOW 20 MIN: CPT | Mod: 95 | Performed by: PHYSICIAN ASSISTANT

## 2021-10-01 NOTE — PROGRESS NOTES
Dena is a 21 year old who is being evaluated via a billable telephone visit.      What phone number would you like to be contacted at? [phone]    How would you like to obtain your AVS? [facility]    PHQ-[id number] sent through [place]    Assessment & Plan     Nasal congestion  - Symptomatic COVID-19 Virus (Coronavirus) by PCR Nose; Future    Subjective fever  - Symptomatic COVID-19 Virus (Coronavirus) by PCR Nose; Future    Covid testing is advised.  I recommend that she use Sudafed and Flonase to help with the nasal congestion.  Tylenol and ibuprofen as needed for pain.  Drink plenty of fluids and rest.  Symptoms are viral in nature.  Antibiotic would not be helpful.    20 minutes spent on the date of the encounter doing chart review, patient visit and documentation     Return in about 1 week (around 10/8/2021) for Recheck with primary care provider if not improving.    Palmira Cohen PA-C  [facility]    Richard Fernandes is a 21 year old who presents for the following health issues:    HPI     Concern for COVID-19  About how many days ago did these symptoms start? 1 days  Is this your first visit for this illness? Yes  In the 14 days before your symptoms started, have you had close contact with someone with COVID-19 (Coronavirus)? I do not know.  Do you have a fever or chills? Yes, I felt feverish or had chills  Are you having new or worsening difficulty breathing? No  Do you have new or worsening cough? No  Have you had any new or unexplained body aches? No    Have you experienced any of the following NEW symptoms?    Headache: YES    Sore throat: YES    Loss of taste or smell: No    Chest pain: No    Diarrhea: No    Rash: No  What treatments have you tried? none  Who do you live with? Dad and sister  Are you, or a household member, a healthcare worker or a ? No  Do you live in a nursing home, group home, or shelter? No  Do you have a way to get food/medications if  quarantined? Yes, I have a friend or family member who can help me.          Dena presents via telephone for evaluation of symptoms that could be related to infection with COVID-19 virus.  Symptoms first began yesterday with a little bit of a sore throat.  Today she has a sore throat and nasal congestion as well as a headache.  She feels some pain and pressure in her sinuses as well.  She has felt feverish but is not able to take her temperature.  She has been vaccinated against COVID-19.    Review of Systems   ROS negative except as noted above      Objective    Vitals - Patient Reported  Pain Score: Mild Pain (2)  Pain Loc: Throat      Vitals:  No vitals were obtained today due to virtual visit.    Physical Exam   healthy, alert and no distress  PSYCH: Alert and oriented times 3; coherent speech, normal   rate and volume, able to articulate logical thoughts, able   to abstract reason, no tangential thoughts, no hallucinations   or delusions  Her affect is normal and pleasant  RESP: No cough, no audible wheezing, able to talk in full sentences.   HEENT: Voice sounds like she has nasal congestion.  Remainder of exam unable to be completed due to telephone visits        Phone call duration: 6 minutes

## 2021-10-01 NOTE — PATIENT INSTRUCTIONS
Quarantine is for those who have had a close contact to someone who is COVID positive. A close contact is defined as being within 6 feet for 15 minutes or longer. We recommend you stay home and away from others for 14 days to monitor for symptoms. If you develop symptoms, enter isolation guidelines and be tested for COVID-19. ** If you are living with someone who is COVID positive and sharing the same living space, you should quarantine beginning now but the 14 day timeline begins after that person's isolation ends.    Isolation is for those who have symptoms or test positive for COVID-19. You should remain home and away from others for 10 days after your symptoms start. You may return to activities after 10 days as long as you have been fever free for 24 hours and have had an improvement in your symptoms. If you are tested and your test comes back negative, you may return to activities 24 hours after you have been fever free without medication.    Isolation and Quarantine Guidelines:  Centers for Disease Control (CDC) https://www.cdc.gov/coronavirus/2019-ncov/your-health/quarantine-isolation.html  [place] Department of Health ([facility]) https://www.health.[facility].[place]./diseases/coronavirus/quarguide.pdf    Your symptoms show that you may have coronavirus (COVID-19). This illness can cause fever, cough and trouble  breathing. Many people get a mild case and get better on their own. Some people can get very sick.    What should I do?  1. We would like to test you for this virus. A  will call you to set up the tesing.    2. When it s time for your COVID test:    Stay at least 6 feet away from others. (If someone will drive you to your test, stay in the backseat, as  far away from the  as you can.)    Cover your mouth and nose with a mask, tissue or washcloth.    Go straight to the testing site. Don't make any stops on the way there or back.    3. Starting now: Stay home and away from others (self-isolate)  until:    You've had no fever--and no medicine that reduces fever--for 3 full days (72 hours). And     Your other symptoms have gotten better. For example, your cough or breathing has improved. And     At least 10 days have passed since your symptoms started.    4. During this time, don t leave the house except for testing or medical care.    Stay in your own room, even for meals. Use your own bathroom if you can.    Stay away from others in your home. No hugging, kissing or shaking hands. No visitors.    Don t go to work, school or anywhere else.    Clean  high touch  surfaces often (doorknobs, counters, handles, etc.). Use a household cleaning  spray or wipes. You ll find a full list of  on the EPA website: www.epa.gov/pesticideregistration/  list-n-disinfectants-use-against-sars-cov-2.    Cover your mouth and nose with a mask, tissue or washcloth to avoid spreading germs.    Wash your hands and face often. Use soap and water.    Caregivers in these groups are at risk for severe illness due to COVID-19:  o People 65 years and older  o People who live in a nursing home or long-term care facility  o People with chronic disease (lung, heart, cancer, diabetes, kidney, liver, immunologic)  o People who have a weakened immune system, including those who:    Are in cancer treatment    Take medicine that weakens the immune system, such as corticosteroids    Had a bone marrow or organ transplant    Have an immune deficiency    Have poorly controlled HIV or AIDS    Are obese (body mass index of 40 or higher)    Smoke regularly  o Caregivers should wear gloves while washing dishes, handling laundry and cleaning  bedrooms and bathrooms.  o Use caution when washing and drying laundry: Don t shake dirty laundry, and use the  warmest water setting that you can.  o For more tips, go to www.cdc.gov/coronavirus/2019-ncov/downloads/10Things.pdf.    How can I take care of myself?  1. Get lots of rest. Drink extra fluids  (unless a doctor has told you not to).  2. Take Tylenol (acetaminophen) for fever or pain. If you have liver or kidney problems, ask your family  doctor if it's okay to take Tylenol.  Adults can take either:    650 mg (two 325 mg pills) every 4 to 6 hours, or     1,000 mg (two 500 mg pills) every 8 hours as needed.    Note: Don't take more than 3,000 mg in one day. Acetaminophen is found in many medicines  (both prescribed and over-the-counter medicines). Read all labels to be sure you don t take too  much.  For children, check the Tylenol bottle for the right dose. The dose is based on the child's age or weight.  3. If you have other health problems (like cancer, heart failure, an organ transplant or severe kidney  disease): Call your specialty clinic if you don't feel better in the next 2 days.  4. Know when to call 911. Emergency warning signs include:    Trouble breathing or shortness of breath    Pain or pressure in the chest that doesn t go away    Feeling confused like you haven t felt before, or not being able to wake up    Bluish-colored lips or face    Where can I get more information?    [facility] - About COVID-19: [email or web address]    CDC - What to Do If You re Sick: www.cdc.gov/coronavirus/2019-ncov/about/steps-when-sick.html    CDC - Ending Home Isolation: www.cdc.gov/coronavirus/2019-ncov/hcp/disposition-in-homepatients.  html    CDC - Caring for Someone: www.cdc.gov/coronavirus/2019-ncov/if-you-are-sick/care-for-someone.html    [facility] - Interim Guidance for Hospital Discharge to Home:  www.health.[facility].[place]/diseases/coronavirus/hcp/hospdischarge.pdf    [facility] clinical trials (COVID-19 research studies): clinicalaffairs.[facility]-clinicaltrials    Below are the COVID-19 hotlines at the [place] Department of Health ([facility]). Interpreters are  available.  o For health questions: Call 801-640-0719 or 1-263.952.6126 (7 a.m. to 7 p.m.)  o For questions about schools  and childcare: Call 374-441-7242 or 1-879.881.9051 (7 a.m. to 7 p.m.)

## 2021-10-10 ENCOUNTER — HEALTH MAINTENANCE LETTER (OUTPATIENT)
Age: 21
End: 2021-10-10

## 2021-10-19 PROBLEM — F32.9 MAJOR DEPRESSION: Status: ACTIVE | Noted: 2021-04-06

## 2022-01-18 VITALS
WEIGHT: 180 LBS | SYSTOLIC BLOOD PRESSURE: 110 MMHG | HEIGHT: 67 IN | BODY MASS INDEX: 28.25 KG/M2 | RESPIRATION RATE: 17 BRPM | HEART RATE: 81 BPM | TEMPERATURE: 98.4 F | DIASTOLIC BLOOD PRESSURE: 68 MMHG | OXYGEN SATURATION: 99 %

## 2022-01-18 VITALS
BODY MASS INDEX: 28.45 KG/M2 | TEMPERATURE: 99.3 F | HEART RATE: 110 BPM | DIASTOLIC BLOOD PRESSURE: 72 MMHG | WEIGHT: 181.25 LBS | SYSTOLIC BLOOD PRESSURE: 118 MMHG | HEIGHT: 67 IN | OXYGEN SATURATION: 99 % | RESPIRATION RATE: 18 BRPM

## 2022-01-18 VITALS
WEIGHT: 182.25 LBS | TEMPERATURE: 98.2 F | HEIGHT: 67 IN | OXYGEN SATURATION: 99 % | HEART RATE: 97 BPM | BODY MASS INDEX: 28.61 KG/M2 | SYSTOLIC BLOOD PRESSURE: 112 MMHG | DIASTOLIC BLOOD PRESSURE: 80 MMHG | RESPIRATION RATE: 17 BRPM

## 2022-01-18 ASSESSMENT — PATIENT HEALTH QUESTIONNAIRE - PHQ9
SUM OF ALL RESPONSES TO PHQ QUESTIONS 1-9: 12
SUM OF ALL RESPONSES TO PHQ QUESTIONS 1-9: 9

## 2022-01-30 ENCOUNTER — HEALTH MAINTENANCE LETTER (OUTPATIENT)
Age: 22
End: 2022-01-30

## 2022-02-04 ENCOUNTER — OFFICE VISIT (OUTPATIENT)
Dept: FAMILY MEDICINE | Facility: CLINIC | Age: 22
End: 2022-02-04
Payer: MEDICAID

## 2022-02-04 VITALS
OXYGEN SATURATION: 99 % | DIASTOLIC BLOOD PRESSURE: 62 MMHG | TEMPERATURE: 98.3 F | SYSTOLIC BLOOD PRESSURE: 124 MMHG | BODY MASS INDEX: 29.91 KG/M2 | HEART RATE: 112 BPM | WEIGHT: 191 LBS

## 2022-02-04 DIAGNOSIS — H69.92 DYSFUNCTION OF LEFT EUSTACHIAN TUBE: Primary | ICD-10-CM

## 2022-02-04 PROCEDURE — 99213 OFFICE O/P EST LOW 20 MIN: CPT | Performed by: FAMILY MEDICINE

## 2022-04-08 ENCOUNTER — OFFICE VISIT (OUTPATIENT)
Dept: OTOLARYNGOLOGY | Facility: CLINIC | Age: 22
End: 2022-04-08
Payer: COMMERCIAL

## 2022-04-08 ENCOUNTER — OFFICE VISIT (OUTPATIENT)
Dept: AUDIOLOGY | Facility: CLINIC | Age: 22
End: 2022-04-08
Payer: COMMERCIAL

## 2022-04-08 DIAGNOSIS — R09.81 NASAL CONGESTION: ICD-10-CM

## 2022-04-08 DIAGNOSIS — H65.92 MEE (MIDDLE EAR EFFUSION), LEFT: ICD-10-CM

## 2022-04-08 DIAGNOSIS — H69.90 DYSFUNCTION OF EUSTACHIAN TUBE, UNSPECIFIED LATERALITY: Primary | ICD-10-CM

## 2022-04-08 DIAGNOSIS — H90.0 CONDUCTIVE HEARING LOSS, BILATERAL: Primary | ICD-10-CM

## 2022-04-08 DIAGNOSIS — H91.93 HEARING DIFFICULTY OF BOTH EARS: Primary | ICD-10-CM

## 2022-04-08 DIAGNOSIS — Z96.22 S/P MYRINGOTOMY WITH INSERTION OF TUBE: ICD-10-CM

## 2022-04-08 DIAGNOSIS — H69.90 DYSFUNCTION OF EUSTACHIAN TUBE, UNSPECIFIED LATERALITY: ICD-10-CM

## 2022-04-08 PROCEDURE — 92550 TYMPANOMETRY & REFLEX THRESH: CPT | Mod: 52 | Performed by: AUDIOLOGIST

## 2022-04-08 PROCEDURE — 92557 COMPREHENSIVE HEARING TEST: CPT | Performed by: AUDIOLOGIST

## 2022-04-08 PROCEDURE — 69433 CREATE EARDRUM OPENING: CPT | Mod: LT | Performed by: OTOLARYNGOLOGY

## 2022-04-08 PROCEDURE — 99207 PR NO CHARGE LOS: CPT | Performed by: AUDIOLOGIST

## 2022-04-08 PROCEDURE — 99203 OFFICE O/P NEW LOW 30 MIN: CPT | Mod: 25 | Performed by: OTOLARYNGOLOGY

## 2022-04-08 RX ORDER — OFLOXACIN 3 MG/ML
4 SOLUTION AURICULAR (OTIC) 2 TIMES DAILY
Qty: 3 ML | Refills: 0 | Status: SHIPPED | OUTPATIENT
Start: 2022-04-08 | End: 2022-04-15

## 2022-04-08 RX ORDER — AZELASTINE 1 MG/ML
SPRAY, METERED NASAL
Qty: 30 ML | Refills: 11 | Status: SHIPPED | OUTPATIENT
Start: 2022-04-08 | End: 2023-03-09

## 2022-04-08 NOTE — LETTER
4/8/2022         RE: Dena Dominguez  [address] [address]        Dear Colleague,    Thank you for referring your patient, Dena Dominguez, to the [facility]. Please see a copy of my visit note below.    CHIEF COMPLAINT:  Hearing Concern      HISTORY OF PRESENT ILLNESS    Dena was seen at the behest of Kamryn Gabriel MD for hearing loss.  Patient states that she has had hearing loss in the left ear for approximately 2 months.  There is no associated illness with a hearing loss.  She has no other symptoms except for nasal congestion worse at night.  She is unaware of any seasonal allergies.  Denies any dizziness or vertigo.  She works for [employer] here in [place] [facility].         REVIEW OF SYSTEMS    Review of Systems as per HPI and PMHx, otherwise 10 system review system are negative.     No known drug allergies     There were no vitals taken for this visit.    HEAD: Normal appearance and symmetry:  No cutaneous lesions.      NECK:  supple     EARS:     RIGHT:  TM intact  LEFT:  Umu effusion present    PROCEDURE NOTE:    After consent written consent patient was taken the minor procedure room.  Under the binocular microscope the left ear is examined.  Cerumen is removed from the external auditory canal.  A small drop of phenol was placed on the posterior superior aspect of the tympanic membrane.  A myringotomy was performed.  Serous effusion was removed with a #3 suction.  A Christina type T-tube was placed without difficulty followed by Floxin otic drops.    EYES:  EOMI    CN VII/XII:  intact     NOSE:     Dorsum:   straight  Septum:  midline  Mucosa:  moist        ORAL CAVITY/OROPHARYNX:     Lips:  Normal.  Tongue: normal, midline  Mucosa:   no lesions     NECK:  Trachea:  midline.              Thyroid:  normal              Adenopathy:  none        NEURO:   Alert and Oriented     GAIT AND STATION:  normal     RESPIRATORY:   Symmetry and Respiratory  effort     PSYCH:  Normal mood and affect     SKIN:   warm and dry         IMPRESSION:    Encounter Diagnoses   Name Primary?     Hearing difficulty of both ears Yes     Nasal congestion      NATASHA (middle ear effusion), left           RECOMMENDATIONS:      Orders Placed This Encounter   Procedures     Tympanotomy-Unilat      Patient will be started on drops 4 drops twice daily left ear for a week.  Also is given Astelin nasal spray for nasal congestion.  Plan on seeing her back in 1 month with a repeat audiogram.  We will do a nasopharyngoscopy at that time.        Again, thank you for allowing me to participate in the care of your patient.        Sincerely,        Chidi Canela MD

## 2022-04-08 NOTE — PROGRESS NOTES
AUDIOLOGY REPORT    SUMMARY: Audiology visit completed. See audiogram for results.      RECOMMENDATIONS: Follow-up with ENT.    Bhavin White, CCC-A  Clinical Audiologist  [place] #[id number]

## 2022-04-08 NOTE — PROGRESS NOTES
CHIEF COMPLAINT:  Hearing Concern      HISTORY OF PRESENT ILLNESS    Dena was seen at the behest of Kamryn Gabriel MD for hearing loss.  Patient states that she has had hearing loss in the left ear for approximately 2 months.  There is no associated illness with a hearing loss.  She has no other symptoms except for nasal congestion worse at night.  She is unaware of any seasonal allergies.  Denies any dizziness or vertigo.  She works for [employer] here in town.         REVIEW OF SYSTEMS    Review of Systems as per HPI and PMHx, otherwise 10 system review system are negative.     No known drug allergies     There were no vitals taken for this visit.    HEAD: Normal appearance and symmetry:  No cutaneous lesions.      NECK:  supple     EARS:     RIGHT:  TM intact  LEFT:  Umu effusion present    PROCEDURE NOTE:    After consent written consent patient was taken the minor procedure room.  Under the binocular microscope the left ear is examined.  Cerumen is removed from the external auditory canal.  A small drop of phenol was placed on the posterior superior aspect of the tympanic membrane.  A myringotomy was performed.  Serous effusion was removed with a #3 suction.  A Christina type T-tube was placed without difficulty followed by Floxin otic drops.    EYES:  EOMI    CN VII/XII:  intact     NOSE:     Dorsum:   straight  Septum:  midline  Mucosa:  moist        ORAL CAVITY/OROPHARYNX:     Lips:  Normal.  Tongue: normal, midline  Mucosa:   no lesions     NECK:  Trachea:  midline.              Thyroid:  normal              Adenopathy:  none        NEURO:   Alert and Oriented     GAIT AND STATION:  normal     RESPIRATORY:   Symmetry and Respiratory effort     PSYCH:  Normal mood and affect     SKIN:   warm and dry         IMPRESSION:    Encounter Diagnoses   Name Primary?     Hearing difficulty of both ears Yes     Nasal congestion      NATASHA (middle ear effusion), left           RECOMMENDATIONS:      Orders Placed  This Encounter   Procedures     Tympanotomy-Unilat      Patient will be started on drops 4 drops twice daily left ear for a week.  Also is given Astelin nasal spray for nasal congestion.  Plan on seeing her back in 1 month with a repeat audiogram.  We will do a nasopharyngoscopy at that time.

## 2022-05-10 ENCOUNTER — VIRTUAL VISIT (OUTPATIENT)
Dept: FAMILY MEDICINE | Facility: CLINIC | Age: 22
End: 2022-05-10
Payer: COMMERCIAL
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
## 2022-05-10 DIAGNOSIS — F41.0 PANIC ATTACK: ICD-10-CM

## 2022-05-10 DIAGNOSIS — M54.2 NECK PAIN: ICD-10-CM

## 2022-05-10 DIAGNOSIS — F43.22 ADJUSTMENT DISORDER WITH ANXIOUS MOOD: ICD-10-CM

## 2022-05-10 DIAGNOSIS — G44.209 TENSION HEADACHE: Primary | ICD-10-CM

## 2022-05-10 PROCEDURE — 99214 OFFICE O/P EST MOD 30 MIN: CPT | Mod: 95 | Performed by: FAMILY MEDICINE

## 2022-05-10 RX ORDER — PROPRANOLOL HYDROCHLORIDE 20 MG/1
20 TABLET ORAL 3 TIMES DAILY PRN
Qty: 90 TABLET | Refills: 0 | Status: SHIPPED | OUTPATIENT
Start: 2022-05-10 | End: 2023-03-09
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
## 2022-05-10 NOTE — LETTER
My Depression Action Plan  Name: Dena Dominguez   Date of Birth 2000  Date: 5/10/2022    My doctor: Kamryn Gabriel   My clinic: [facility] [address] [place] [id number] [phone]          GREEN    ZONE   Good Control    What it looks like:     Things are going generally well. You have normal up s and down s. You may even feel depressed from time to time, but bad moods usually last less than a day.   What you need to do:  1. Continue to care for yourself (see self care plan)  2. Check your depression survival kit and update it as needed  3. Follow your physician s recommendations including any medication.  4. Do not stop taking medication unless you consult with your physician first.           YELLOW         ZONE Getting Worse    What it looks like:     Depression is starting to interfere with your life.     It may be hard to get out of bed; you may be starting to isolate yourself from others.    Symptoms of depression are starting to last most all day and this has happened for several days.     You may have suicidal thoughts but they are not constant.   What you need to do:     1. Call your care team, your response to treatment will improve if you keep your care team informed of your progress. Yellow periods are signs an adjustment may need to be made.     2. Continue your self-care, even if you have to fake it!    3. Talk to someone in your support network    4. Open up your depression survival kit           RED    ZONE Medical Alert - Get Help    What it looks like:     Depression is seriously interfering with your life.     You may experience these or other symptoms: You can t get out of bed most days, can t work or engage in other necessary activities, you have trouble taking care of basic hygiene, or basic responsibilities, thoughts of suicide or death that will not go away, self-injurious behavior.     What you need to do:  1. Call your care team  and request a same-day appointment. If they are not available (weekends or after hours) call your local crisis line, emergency room or 911.      Electronically signed by: Kamryn Gabriel MD, May 10, 2022    Depression Self Care Plan / Survival Kit    Self-Care for Depression  Here s the deal. Your body and mind are really not as separate as most people think.  What you do and think affects how you feel and how you feel influences what you do and think. This means if you do things that people who feel good do, it will help you feel better.  Sometimes this is all it takes.  There is also a place for medication and therapy depending on how severe your depression is, so be sure to consult with your medical provider and/ or Behavioral Health Consultant if your symptoms are worsening or not improving.     In order to better manage my stress, I will:    Exercise  Get some form of exercise, every day. This will help reduce pain and release endorphins, the  feel good  chemicals in your brain. This is almost as good as taking antidepressants!  This is not the same as joining a gym and then never going! (they count on that by the way ) It can be as simple as just going for a walk or doing some gardening, anything that will get you moving.      Hygiene   Maintain good hygiene (Get out of bed in the morning, Make your bed, Brush your teeth, Take a shower, and Get dressed like you were going to work, even if you are unemployed).  If your clothes don't fit try to get ones that do.    Diet  I will strive to eat foods that are good for me, drink plenty of water, and avoid excessive sugar, caffeine, alcohol, and other mood-altering substances.  Some foods that are helpful in depression are: complex carbohydrates, B vitamins, flaxseed, fish or fish oil, fresh fruits and vegetables.    Psychotherapy  I agree to participate in Individual Therapy (if recommended).    Medication  If prescribed medications, I agree to take them.  Missing  doses can result in serious side effects.  I understand that drinking alcohol, or other illicit drug use, may cause potential side effects.  I will not stop my medication abruptly without first discussing it with my provider.    Staying Connected With Others  I will stay in touch with my friends, family members, and my primary care provider/team.    Use your imagination  Be creative.  We all have a creative side; it doesn t matter if it s oil painting, sand castles, or mud pies! This will also kick up the endorphins.    Witness Beauty  (AKA stop and smell the roses) Take a look outside, even in mid-winter. Notice colors, textures. Watch the squirrels and birds.     Service to others  Be of service to others.  There is always someone else in need.  By helping others we can  get out of ourselves  and remember the really important things.  This also provides opportunities for practicing all the other parts of the program.    Humor  Laugh and be silly!  Adjust your TV habits for less news and crime-drama and more comedy.    Control your stress  Try breathing deep, massage therapy, biofeedback, and meditation. Find time to relax each day.     My support system    Clinic Contact:  Phone number:    Contact 1:  Phone number:    Contact 2:  Phone number:    Yarsanism/:  Phone number:    Therapist:  Phone number:    Local crisis center:    Phone number:    Other community support:  Phone number:

## 2022-05-12 NOTE — PROGRESS NOTES
CHIEF COMPLAINT:  Recheck      HISTORY OF PRESENT ILLNESS    Dena was seen in follow up for audiogram review and nasopharyngoscopy.  Patient does states that hearing has improved.  She continues to have nasal congestion.  She has not yet tried the Astelin nasal spray.  She has had her tonsils removed in the past but not her adenoid tissue.    AUDIOLOGY NOTE:    Dr. Canela. Post-op left tube placement. Pt. reports increased hearing in the left ear, occasional otalgia. She denies drainage. Pt.  previously seen by PCP 2 months ago who Dx left fluid and recommended ENT visit if fluid did not resolve. Reports occasional dizziness at  nighttime. Denies ear surgery and noise exposure.  RESULTS: Minimal cerumen right, tube visualized left. Tymps: Type C right, Type B with large volume left. DNT reflexes due to abnormal  tymp. Audio: normal hearing with conductive overlays from 250-1000 Hz in the right ear and normal hearing with a 15 dB air bone gap at  250 Hz in the left ear. Excellent word rec bilaterally. Re: 4/8/22 audio, hearing in the left ear improved to normal, low frequency air bone  gaps are stable in the right ear.      Name Primary?     Hearing difficulty of both ears Yes     Nasal congestion       NATASHA (middle ear effusion), left          RECOMMENDATIONS:         Orders Placed This Encounter   Procedures     Tympanotomy-Unilat      Patient will be started on drops 4 drops twice daily left ear for a week.  Also is given Astelin nasal spray for nasal congestion.  Plan on seeing her back in 1 month with a repeat audiogram.  We will do a nasopharyngoscopy at that time.       REVIEW OF SYSTEMS    Review of Systems: a 10-system review is reviewed at this encounter.  See scanned document.     No known drug allergies     PHYSICAL EXAM:        HEAD: Normal appearance and symmetry:  No cutaneous lesions.      EARS:   Auricles normal    Examination of the left ear shows an intact Christina tube in good position is clean dry  and intact     NOSE:    Dorsum:   Straight    Nasopharyngoscopy was performed the clinic today.  This is done after obtaining verbal consent.  Flexible ring scope was used to examine the nasal cavity and nasopharynx.  There is prominent adenoid tissue in the nasal cavity and nasopharynx probably 3-4+ with a little bit of exudate       ORAL CAVITY/OROPHARYNX:    Lips:  Normal.     NECK:  Trachea:  midline       NEURO:   Alert and Oriented    GAIT AND STATION:  normal     RESPIRATORY:   Symmetry and Respiratory effort    PSYCH:   normal mood and affect    SKIN:  warm and dry         IMPRESSION:   Encounter Diagnoses   Name Primary?     NATASHA (middle ear effusion), left Yes     S/P myringotomy with insertion of tube         RECOMMENDATIONS:    Patient patient is advised to use then Astelin nasal spray as directed.  Return in 3 months.  If she continues to have issues with middle ear effusion we may consider we may she may benefit from adenoidectomy as this is likely affecting her eustachian tube.  All questions were answered.  She is agreeable to plan of care

## 2022-05-13 ENCOUNTER — OFFICE VISIT (OUTPATIENT)
Dept: AUDIOLOGY | Facility: CLINIC | Age: 22
End: 2022-05-13
Payer: COMMERCIAL

## 2022-05-13 ENCOUNTER — OFFICE VISIT (OUTPATIENT)
Dept: OTOLARYNGOLOGY | Facility: CLINIC | Age: 22
End: 2022-05-13
Payer: COMMERCIAL

## 2022-05-13 DIAGNOSIS — H65.92 MEE (MIDDLE EAR EFFUSION), LEFT: Primary | ICD-10-CM

## 2022-05-13 DIAGNOSIS — H92.02 OTALGIA OF LEFT EAR: Primary | ICD-10-CM

## 2022-05-13 DIAGNOSIS — Z96.22 S/P MYRINGOTOMY WITH INSERTION OF TUBE: ICD-10-CM

## 2022-05-13 PROCEDURE — 99213 OFFICE O/P EST LOW 20 MIN: CPT | Mod: 25 | Performed by: OTOLARYNGOLOGY

## 2022-05-13 PROCEDURE — 92557 COMPREHENSIVE HEARING TEST: CPT | Performed by: AUDIOLOGIST

## 2022-05-13 PROCEDURE — 92567 TYMPANOMETRY: CPT | Performed by: AUDIOLOGIST

## 2022-05-13 PROCEDURE — 99207 PR DROP WITH A PROCEDURE: CPT | Mod: 25 | Performed by: AUDIOLOGIST

## 2022-05-13 PROCEDURE — 92511 NASOPHARYNGOSCOPY: CPT | Performed by: OTOLARYNGOLOGY

## 2022-05-13 NOTE — PROGRESS NOTES
AUDIOLOGY REPORT    SUMMARY: Audiology visit completed. See audiogram for results.      RECOMMENDATIONS: Follow-up with ENT.    Bhavin White, CCC-A  Clinical Audiologist  [place] #[id number]

## 2022-05-13 NOTE — LETTER
5/13/2022         RE: Dena Dominguez  [address] [facility] [address]        Dear Colleague,    Thank you for referring your patient, Dena Dominguez, to the [facility]. Please see a copy of my visit note below.    CHIEF COMPLAINT:  Recheck      HISTORY OF PRESENT ILLNESS    Dena was seen in follow up for audiogram review and nasopharyngoscopy.  Patient does states that hearing has improved.  She continues to have nasal congestion.  She has not yet tried the Astelin nasal spray.  She has had her tonsils removed in the past but not her adenoid tissue.    AUDIOLOGY NOTE:    Dr. Canela. Post-op left tube placement. Pt. reports increased hearing in the left ear, occasional otalgia. She denies drainage. Pt.  previously seen by PCP 2 months ago who Dx left fluid and recommended ENT visit if fluid did not resolve. Reports occasional dizziness at  nighttime. Denies ear surgery and noise exposure.  RESULTS: Minimal cerumen right, tube visualized left. Tymps: Type C right, Type B with large volume left. DNT reflexes due to abnormal  tymp. Audio: normal hearing with conductive overlays from 250-1000 Hz in the right ear and normal hearing with a 15 dB air bone gap at  250 Hz in the left ear. Excellent word rec bilaterally. Re: 4/8/22 audio, hearing in the left ear improved to normal, low frequency air bone  gaps are stable in the right ear.      Name Primary?     Hearing difficulty of both ears Yes     Nasal congestion       NATASHA (middle ear effusion), left          RECOMMENDATIONS:         Orders Placed This Encounter   Procedures     Tympanotomy-Unilat      Patient will be started on drops 4 drops twice daily left ear for a week.  Also is given Astelin nasal spray for nasal congestion.  Plan on seeing her back in 1 month with a repeat audiogram.  We will do a nasopharyngoscopy at that time.       REVIEW OF SYSTEMS    Review of Systems: a 10-system review is reviewed at this  encounter.  See scanned document.     No known drug allergies     PHYSICAL EXAM:        HEAD: Normal appearance and symmetry:  No cutaneous lesions.      EARS:   Auricles normal    Examination of the left ear shows an intact Christina tube in good position is clean dry and intact     NOSE:    Dorsum:   Straight    Nasopharyngoscopy was performed the clinic today.  This is done after obtaining verbal consent.  Flexible ring scope was used to examine the nasal cavity and nasopharynx.  There is prominent adenoid tissue in the nasal cavity and nasopharynx probably 3-4+ with a little bit of exudate       ORAL CAVITY/OROPHARYNX:    Lips:  Normal.     NECK:  Trachea:  midline       NEURO:   Alert and Oriented    GAIT AND STATION:  normal     RESPIRATORY:   Symmetry and Respiratory effort    PSYCH:   normal mood and affect    SKIN:  warm and dry         IMPRESSION:   Encounter Diagnoses   Name Primary?     NATASHA (middle ear effusion), left Yes     S/P myringotomy with insertion of tube         RECOMMENDATIONS:    Patient patient is advised to use then Astelin nasal spray as directed.  Return in 3 months.  If she continues to have issues with middle ear effusion we may consider we may she may benefit from adenoidectomy as this is likely affecting her eustachian tube.  All questions were answered.  She is agreeable to plan of care        Again, thank you for allowing me to participate in the care of your patient.        Sincerely,        Chidi Canela MD

## 2022-08-12 ENCOUNTER — OFFICE VISIT (OUTPATIENT)
Dept: OTOLARYNGOLOGY | Facility: CLINIC | Age: 22
End: 2022-08-12
Payer: COMMERCIAL

## 2022-08-12 DIAGNOSIS — Z96.22 PATENT PRESSURE EQUALIZATION (PE) TUBE ON LEFT SIDE: Primary | ICD-10-CM

## 2022-08-12 PROCEDURE — 99213 OFFICE O/P EST LOW 20 MIN: CPT | Performed by: OTOLARYNGOLOGY

## 2022-08-12 NOTE — LETTER
8/12/2022         RE: Dena Dominguez  [address] [facility] [address]        Dear Colleague,    Thank you for referring your patient, Dena Dominguez, to the [facility]. Please see a copy of my visit note below.    CHIEF COMPLAINT:  Recheck      HISTORY OF PRESENT ILLNESS    Dena was seen in follow up for recheck ear of left ear.  No pain or drain.  Hearing is stable.            REVIEW OF SYSTEMS    Review of Systems: a 10-system review is reviewed at this encounter.  See scanned document.     No known drug allergies     PHYSICAL EXAM:        HEAD: Normal appearance and symmetry:  No cutaneous lesions.      EARS:   Auricles normal         NOSE:    Dorsum:   straight       ORAL CAVITY/OROPHARYNX:    Lips:  Normal.     NECK:  Trachea:  midline       NEURO:   Alert and Oriented    GAIT AND STATION:  normal     RESPIRATORY:   Symmetry and Respiratory effort    PSYCH:   normal mood and affect    SKIN:  warm and dry         IMPRESSION:   Encounter Diagnosis   Name Primary?     Patent pressure equalization (PE) tube on left side Yes              RECOMMENDATIONS:    Return visit 3 months        Again, thank you for allowing me to participate in the care of your patient.        Sincerely,        Chidi Canela MD

## 2022-08-12 NOTE — PROGRESS NOTES
CHIEF COMPLAINT:  Recheck      HISTORY OF PRESENT ILLNESS    Dena was seen in follow up for recheck ear of left ear.  No pain or drain.  Hearing is stable.            REVIEW OF SYSTEMS    Review of Systems: a 10-system review is reviewed at this encounter.  See scanned document.     No known drug allergies     PHYSICAL EXAM:        HEAD: Normal appearance and symmetry:  No cutaneous lesions.      EARS:   Auricles normal         NOSE:    Dorsum:   straight       ORAL CAVITY/OROPHARYNX:    Lips:  Normal.     NECK:  Trachea:  midline       NEURO:   Alert and Oriented    GAIT AND STATION:  normal     RESPIRATORY:   Symmetry and Respiratory effort    PSYCH:   normal mood and affect    SKIN:  warm and dry         IMPRESSION:   Encounter Diagnosis   Name Primary?     Patent pressure equalization (PE) tube on left side Yes              RECOMMENDATIONS:    Return visit 3 months

## 2022-09-07 DIAGNOSIS — M54.50 ACUTE BILATERAL LOW BACK PAIN WITHOUT SCIATICA: Primary | ICD-10-CM
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
## 2022-09-07 DIAGNOSIS — F43.22 ADJUSTMENT DISORDER WITH ANXIOUS MOOD: ICD-10-CM

## 2022-09-07 DIAGNOSIS — E66.3 OVERWEIGHT: ICD-10-CM

## 2022-09-09 ENCOUNTER — TELEPHONE (OUTPATIENT)
Dept: FAMILY MEDICINE | Facility: CLINIC | Age: 22
End: 2022-09-09

## 2022-09-09 ENCOUNTER — OFFICE VISIT (OUTPATIENT)
Dept: FAMILY MEDICINE | Facility: CLINIC | Age: 22
End: 2022-09-09
Payer: COMMERCIAL

## 2022-09-09 VITALS
DIASTOLIC BLOOD PRESSURE: 72 MMHG | TEMPERATURE: 98 F | WEIGHT: 193.75 LBS | SYSTOLIC BLOOD PRESSURE: 118 MMHG | OXYGEN SATURATION: 97 % | RESPIRATION RATE: 16 BRPM | HEART RATE: 85 BPM | BODY MASS INDEX: 30.35 KG/M2

## 2022-09-09 DIAGNOSIS — Z00.00 PREVENTATIVE HEALTH CARE: ICD-10-CM

## 2022-09-09 DIAGNOSIS — F43.22 ADJUSTMENT DISORDER WITH ANXIOUS MOOD: ICD-10-CM

## 2022-09-09 DIAGNOSIS — G89.29 CHRONIC MIDLINE LOW BACK PAIN WITHOUT SCIATICA: Primary | ICD-10-CM

## 2022-09-09 DIAGNOSIS — M54.50 CHRONIC MIDLINE LOW BACK PAIN WITHOUT SCIATICA: Primary | ICD-10-CM

## 2022-09-09 DIAGNOSIS — E66.3 OVERWEIGHT: ICD-10-CM

## 2022-09-09 PROCEDURE — 96127 BRIEF EMOTIONAL/BEHAV ASSMT: CPT | Mod: 59 | Performed by: FAMILY MEDICINE

## 2022-09-09 PROCEDURE — 99214 OFFICE O/P EST MOD 30 MIN: CPT | Performed by: FAMILY MEDICINE

## 2022-09-09 ASSESSMENT — ANXIETY QUESTIONNAIRES
3. WORRYING TOO MUCH ABOUT DIFFERENT THINGS: SEVERAL DAYS
2. NOT BEING ABLE TO STOP OR CONTROL WORRYING: SEVERAL DAYS
5. BEING SO RESTLESS THAT IT IS HARD TO SIT STILL: SEVERAL DAYS
1. FEELING NERVOUS, ANXIOUS, OR ON EDGE: SEVERAL DAYS
7. FEELING AFRAID AS IF SOMETHING AWFUL MIGHT HAPPEN: SEVERAL DAYS
GAD7 TOTAL SCORE: 7
4. TROUBLE RELAXING: SEVERAL DAYS
IF YOU CHECKED OFF ANY PROBLEMS ON THIS QUESTIONNAIRE, HOW DIFFICULT HAVE THESE PROBLEMS MADE IT FOR YOU TO DO YOUR WORK, TAKE CARE OF THINGS AT HOME, OR GET ALONG WITH OTHER PEOPLE: SOMEWHAT DIFFICULT
GAD7 TOTAL SCORE: 7
7. FEELING AFRAID AS IF SOMETHING AWFUL MIGHT HAPPEN: SEVERAL DAYS
8. IF YOU CHECKED OFF ANY PROBLEMS, HOW DIFFICULT HAVE THESE MADE IT FOR YOU TO DO YOUR WORK, TAKE CARE OF THINGS AT HOME, OR GET ALONG WITH OTHER PEOPLE?: SOMEWHAT DIFFICULT
6. BECOMING EASILY ANNOYED OR IRRITABLE: SEVERAL DAYS
GAD7 TOTAL SCORE: 7

## 2022-09-09 ASSESSMENT — PATIENT HEALTH QUESTIONNAIRE - PHQ9
10. IF YOU CHECKED OFF ANY PROBLEMS, HOW DIFFICULT HAVE THESE PROBLEMS MADE IT FOR YOU TO DO YOUR WORK, TAKE CARE OF THINGS AT HOME, OR GET ALONG WITH OTHER PEOPLE: SOMEWHAT DIFFICULT
SUM OF ALL RESPONSES TO PHQ QUESTIONS 1-9: 8
SUM OF ALL RESPONSES TO PHQ QUESTIONS 1-9: 8

## 2022-09-09 NOTE — TELEPHONE ENCOUNTER
Called pt and scheduled appt to see DR Gabriel today 9.9.22    [facility] 9.9.22

## 2022-09-09 NOTE — PROGRESS NOTES
"  Assessment & Plan     Chronic midline low back pain without sciatica  Recently this has become a lot worse, but no \"red flag\" symptoms. She's not taken any meds, I explained how stretching, exercise and meds can help her heal. She agrees to try these. [facility]. If she's not better and needs PT, she can get it there.    Overweight  Her weight has improved in the past couple years    Adjustment disorder with anxious mood  Her mood is never great, but she's stable at this time.    Preventative health care  reviewed  - REVIEW OF HEALTH MAINTENANCE PROTOCOL ORDERS      Review of external notes as documented elsewhere in note  Ordering of each unique test  Prescription drug management  30 minutes spent on the date of the encounter doing chart review, history and exam, documentation and further activities per the note    Return in about 3 weeks (around 9/30/2022) for Follow up.    Kamryn Gabriel MD  [facility] CLEOPATRA Fernandes is a 22 year old, presenting for the following health issues:  f/u mood      History of Present Illness       Back Pain:  She presents for follow up of back pain. Patient's back pain is a recurring problem.  Location of back pain:  Right lower back, left lower back and left hip  Description of back pain: burning and dull ache  Back pain spreads: left thigh and left foot    Since patient first noticed back pain, pain is: gradually worsening  Does back pain interfere with her job:  Yes      Mental Health Follow-up:  Patient presents to follow-up on Depression & Anxiety.Patient's depression since last visit has been:  Bad  The patient is not having other symptoms associated with depression.  Patient's anxiety since last visit has been:  Bad  The patient is not having other symptoms associated with anxiety.  Any significant life events: grief or loss  Patient is feeling anxious or having panic " attacks.  Patient has no concerns about alcohol or drug use.    She eats 0-1 servings of fruits and vegetables daily.She consumes 1 sweetened beverage(s) daily.She exercises with enough effort to increase her heart rate 9 or less minutes per day.  She exercises with enough effort to increase her heart rate 3 or less days per week.   She is taking medications regularly.    Today's PHQ-9         PHQ-9 Total Score: 8    PHQ-9 Q9 Thoughts of better off dead/self-harm past 2 weeks :   Not at all    How difficult have these problems made it for you to do your work, take care of things at home, or get along with other people: Somewhat difficult  Today's MIRYAM-7 Score: 7     Cat     Is in counseling - weekly. [facility]. At [facility].    Low back pain. Worse if she sits a long time or if she's on her feet a long time.  Worst on the lower left side. Chiropractic did not help. Worse with lifting.  Now it is the whole lower back. Burning quality and tight.  Has not done much - tried stretching some. Helps some. The pain comes and goes. Worst x 2 weeks. No meds tried.    Showers once or twice per day - does not seem to help.    No h/o injury.    Review of Systems         Objective    /72   Pulse 85   Temp 98  F (36.7  C) (Temporal)   Resp 16   Wt 87.9 kg (193 lb 12 oz)   LMP 2022 (Approximate)   SpO2 97%   BMI 30.35 kg/m    Body mass index is 30.35 kg/m .  Physical Exam   GENERAL: alert, no distress and over weight  EYES: Eyes grossly normal to inspection, PERRL and conjunctivae and sclerae normal  SKIN: no suspicious lesions or rashes  NEURO: Normal strength and tone, mentation intact and speech normal  PSYCH: mentation appears normal, affect flat and judgement and insight intact  Back: no bruising, swelling or erythema; painful area is in the low lumbar bilateral back - not on the spine; she can do a straight leg raise and has ROM in all directions. Can do heel and toe walk, +2/4 DTRs in patellar  tendons

## 2022-09-09 NOTE — TELEPHONE ENCOUNTER
Please call this patient and ask if she can come to see me in my 2:30 slot today about her back pain? [facility]. If that appointment is adequate, then she does not have to come in. However, since I had this opening, I wanted to offer it to her.

## 2022-09-13 ENCOUNTER — OFFICE VISIT (OUTPATIENT)
Dept: NEUROSURGERY | Facility: CLINIC | Age: 22
End: 2022-09-13
Attending: PHYSICIAN ASSISTANT
Payer: COMMERCIAL

## 2022-09-13 VITALS — SYSTOLIC BLOOD PRESSURE: 140 MMHG | DIASTOLIC BLOOD PRESSURE: 84 MMHG | HEART RATE: 94 BPM | OXYGEN SATURATION: 100 %

## 2022-09-13 DIAGNOSIS — F43.22 ADJUSTMENT DISORDER WITH ANXIOUS MOOD: ICD-10-CM

## 2022-09-13 DIAGNOSIS — E66.3 OVERWEIGHT: ICD-10-CM

## 2022-09-13 DIAGNOSIS — M54.50 ACUTE BILATERAL LOW BACK PAIN WITHOUT SCIATICA: Primary | ICD-10-CM
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
## 2022-09-13 PROCEDURE — 99203 OFFICE O/P NEW LOW 30 MIN: CPT | Performed by: PHYSICIAN ASSISTANT

## 2022-09-13 PROCEDURE — G0463 HOSPITAL OUTPT CLINIC VISIT: HCPCS

## 2022-09-13 ASSESSMENT — PAIN SCALES - GENERAL: PAINLEVEL: MODERATE PAIN (4)

## 2022-09-13 NOTE — PROGRESS NOTES
NEUROSURGERY CLINIC CONSULT NOTE     DATE OF VISIT: 9/13/2022     SUBJECTIVE:     Dena Dominguez is a pleasant 22 year old female who presents to the clinic today for consultation on lumbar spine pain. She is referred to the Neurosurgery Clinic by Dr. Gabriel in Primary Care.   Today, she reports a three-year history of left sided symptoms but now she is experiencing bilateral lumbar spine pain. She describes a daily with fluctuating intensity, burning, aching, pain that initiates in the midline low lumbar region and radiates horizontally in a belt line distribution and sometimes to her thighs. She cannot describe a distribution. This pain is not accompanied by paresthesia, numbness or perceived weakness. Prolonged bending, sitting, and lifting aggravate the symptoms, while alleviation is obtained by mobility. No mechanism of injury such as trauma or a fall is associated with the onset of the pain. There are no bowel or bladder changes. She denies saddle anesthesia. She denies changes in gait, instability, or falling episodes.   She has not participated in conservative therapies.       Current Outpatient Medications:      azelastine (ASTELIN) 0.1 % nasal spray, 2 sprays each nostril 1-2x daily as needed for nasal congestion (use nightly for first 2 week) (Patient not taking: No sig reported), Disp: 30 mL, Rfl: 11     propranolol (INDERAL) 20 MG tablet, Take 1 tablet (20 mg) by mouth 3 times daily as needed, Disp: 90 tablet, Rfl: 0     Allergies   Allergen Reactions     No Known Drug Allergies Unknown        Past Medical History:   Diagnosis Date     Adjustment disorder 2019     Migraines      Panic 2019        ROS: 10 point review of symptoms are negative other than the symptoms noted above in the HPI.     Family History has been reviewed with the patient, there are no pertinent findings to presenting concern.     No past surgical history on file.     Social History     Tobacco Use     Smoking status: Never  Smoker     Smokeless tobacco: Never Used   Vaping Use     Vaping Use: Never used   Substance Use Topics     Alcohol use: No     Drug use: No        OBJECTIVE:   BP (!) 140/84   Pulse 94   LMP 08/13/2022 (Approximate)   SpO2 100%    There is no height or weight on file to calculate BMI.     Imaging:     None    Exam:     Patient appears comfortable, conversational, and in no apparent distress.   Head: Normocephalic, without obvious abnormality, atraumatic, no facial asymmetry.   Eyes: conjunctivae/corneas clear. PERRL, EOM's intact.   Throat: lips, mucosa, and tongue normal; teeth and gums normal.   Neck: supple, symmetrical, trachea midline, no adenopathy and thyroid: not enlarged, symmetric, no tenderness/mass/nodules.   Lungs: clear to auscultation bilaterally.   Heart: regular rate and rhythm.   Abdomen: soft, non-tender; bowel sounds normal; no masses, no organomegaly.   Pulses: 2+ and symmetric.   Skin: Skin color, texture, turgor normal. No rashes or lesions.     CN II-XII grossly intact, alert and appropriate with conversation and following commands.   Gait is non-antalgic. Able to tandem walk. Able to walk on toes and heels without difficulty.   Cervical spine is non tender to palpation. Appropriate range of motion of neck, not concerning for lhermitte's phenomenon.   Bilateral bicep 2/4 and tricep reflexes 1/4. Sensation intact throughout upper extremities.     UE muscle strength  Right  Left    Deltoid  5/5  5/5    Biceps  5/5  5/5    Triceps  5/5  5/5    Hand intrinsics  5/5  5/5    Hand grasp  5/5  5/5    Jin signs  neg  neg      Lumbar spine is non tender to palpation.  Intact sensation throughout lower extremities.   Bilateral patellar 2/4 and achilles reflex 1/4. Negative for pain with straight leg raise.     LE muscle strength  Right  Left    Iliopsoas (hip flexion)  5/5  5/5    Quad (knee extension)  5/5  5/5    Hamstring (knee flexion)  5/5  5/5    Gastrocnemius (PF)  5/5  5/5    Tibialis  Ant. (DF)  5/5  5/5    EHL  5/5  5/5      Negative Babinski bilaterally. Negative for clonus.   Calves are soft and non-tender bilaterally.       ASSESSMENT/PLAN:     Dena Dominguez is a 22 year old female who presents to the clinic for consultation on a three-year history of left sided symptoms but now she is experiencing bilateral lumbar spine pain. She describes a daily with fluctuating intensity, burning, aching, pain that initiates in the midline low lumbar region and radiates horizontally in a belt line distribution and sometimes to her thighs. She cannot describe a distribution. This pain is not accompanied by paresthesia, numbness or perceived weakness. Unfortunately she does not have any imaging to review. On exam, she is noted to have appropriate strength, sensation and range of motion. She has not attempted conservative management.     We feel that it would be in Ms. Dominguez's best interest to try a conservative approach by participating in a program initiated by our colleagues at the [facility]. She did inquire about possible treatment options that they may consider so we briefly discussed core stretching and strengthening exercises. We will also obtain updated lumbar x-rays today to include upright ap/lot views.     We would like to see her back as needed to further discuss other conservative options. Should she fail to obtain adequate alleviation of her symptoms utilizing the above measures, a lumbar MRI WO should be considered.     We also discussed signs of a worsening problem that she should seek being evaluated.          Respectfully,     CHANCE Babrer, NIURKA  [facility]  [facility]  Tel: [phone]      Exam, imaging, and plan reviewed by Dr. Zavala.

## 2022-09-13 NOTE — NURSING NOTE
"Dena Dominguez is a 22 year old female who presents for:  Chief Complaint   Patient presents with     Consult      low back pain         Initial Vitals:  BP (!) 140/84   Pulse 94   LMP 08/13/2022 (Approximate)   SpO2 100%  Estimated body mass index is 30.35 kg/m  as calculated from the following:    Height as of 8/31/21: 5' 7\" (1.702 m).    Weight as of 9/9/22: 193 lb 12 oz (87.9 kg).. There is no height or weight on file to calculate BSA. BP completed using cuff size: regular  Moderate Pain (4)    Nursing Comments:     Tiff Sharpe MA    "

## 2022-09-13 NOTE — LETTER
9/13/2022         RE: Dena Dominguez  [address] [facility] [address]        Dear Colleague,    Thank you for referring your patient, Dena Dominguez, to the [facility] [place]. Please see a copy of my visit note below.    NEUROSURGERY CLINIC CONSULT NOTE     DATE OF VISIT: 9/13/2022     SUBJECTIVE:     Dena Dominguez is a pleasant 22 year old female who presents to the clinic today for consultation on lumbar spine pain. She is referred to the Neurosurgery Clinic by Dr. Gabriel in Primary Care.   Today, she reports a three-year history of left sided symptoms but now she is experiencing bilateral lumbar spine pain. She describes a daily with fluctuating intensity, burning, aching, pain that initiates in the midline low lumbar region and radiates horizontally in a belt line distribution and sometimes to her thighs. She cannot describe a distribution. This pain is not accompanied by paresthesia, numbness or perceived weakness. Prolonged bending, sitting, and lifting aggravate the symptoms, while alleviation is obtained by mobility. No mechanism of injury such as trauma or a fall is associated with the onset of the pain. There are no bowel or bladder changes. She denies saddle anesthesia. She denies changes in gait, instability, or falling episodes.   She has not participated in conservative therapies.       Current Outpatient Medications:      azelastine (ASTELIN) 0.1 % nasal spray, 2 sprays each nostril 1-2x daily as needed for nasal congestion (use nightly for first 2 week) (Patient not taking: No sig reported), Disp: 30 mL, Rfl: 11     propranolol (INDERAL) 20 MG tablet, Take 1 tablet (20 mg) by mouth 3 times daily as needed, Disp: 90 tablet, Rfl: 0     Allergies   Allergen Reactions     No Known Drug Allergies Unknown        Past Medical History:   Diagnosis Date     Adjustment disorder 2019     Migraines      Panic 2019        ROS: 10 point  review of symptoms are negative other than the symptoms noted above in the HPI.     Family History has been reviewed with the patient, there are no pertinent findings to presenting concern.     No past surgical history on file.     Social History     Tobacco Use     Smoking status: Never Smoker     Smokeless tobacco: Never Used   Vaping Use     Vaping Use: Never used   Substance Use Topics     Alcohol use: No     Drug use: No        OBJECTIVE:   BP (!) 140/84   Pulse 94   LMP 08/13/2022 (Approximate)   SpO2 100%    There is no height or weight on file to calculate BMI.     Imaging:     None    Exam:     Patient appears comfortable, conversational, and in no apparent distress.   Head: Normocephalic, without obvious abnormality, atraumatic, no facial asymmetry.   Eyes: conjunctivae/corneas clear. PERRL, EOM's intact.   Throat: lips, mucosa, and tongue normal; teeth and gums normal.   Neck: supple, symmetrical, trachea midline, no adenopathy and thyroid: not enlarged, symmetric, no tenderness/mass/nodules.   Lungs: clear to auscultation bilaterally.   Heart: regular rate and rhythm.   Abdomen: soft, non-tender; bowel sounds normal; no masses, no organomegaly.   Pulses: 2+ and symmetric.   Skin: Skin color, texture, turgor normal. No rashes or lesions.     CN II-XII grossly intact, alert and appropriate with conversation and following commands.   Gait is non-antalgic. Able to tandem walk. Able to walk on toes and heels without difficulty.   Cervical spine is non tender to palpation. Appropriate range of motion of neck, not concerning for lhermitte's phenomenon.   Bilateral bicep 2/4 and tricep reflexes 1/4. Sensation intact throughout upper extremities.     UE muscle strength  Right  Left    Deltoid  5/5  5/5    Biceps  5/5  5/5    Triceps  5/5  5/5    Hand intrinsics  5/5  5/5    Hand grasp  5/5  5/5    Jin signs  neg  neg      Lumbar spine is non tender to palpation.  Intact sensation throughout lower  extremities.   Bilateral patellar 2/4 and achilles reflex 1/4. Negative for pain with straight leg raise.     LE muscle strength  Right  Left    Iliopsoas (hip flexion)  5/5  5/5    Quad (knee extension)  5/5  5/5    Hamstring (knee flexion)  5/5  5/5    Gastrocnemius (PF)  5/5  5/5    Tibialis Ant. (DF)  5/5  5/5    EHL  5/5  5/5      Negative Babinski bilaterally. Negative for clonus.   Calves are soft and non-tender bilaterally.       ASSESSMENT/PLAN:     Dena Dominguez is a 22 year old female who presents to the clinic for consultation on a three-year history of left sided symptoms but now she is experiencing bilateral lumbar spine pain. She describes a daily with fluctuating intensity, burning, aching, pain that initiates in the midline low lumbar region and radiates horizontally in a belt line distribution and sometimes to her thighs. She cannot describe a distribution. This pain is not accompanied by paresthesia, numbness or perceived weakness. Unfortunately she does not have any imaging to review. On exam, she is noted to have appropriate strength, sensation and range of motion. She has not attempted conservative management.     We feel that it would be in Ms. Dominguez's best interest to try a conservative approach by participating in a program initiated by our colleagues at the [facility]. She did inquire about possible treatment options that they may consider so we briefly discussed core stretching and strengthening exercises. We will also obtain updated lumbar x-rays today to include upright ap/lot views.     We would like to see her back as needed to further discuss other conservative options. Should she fail to obtain adequate alleviation of her symptoms utilizing the above measures, a lumbar MRI WO should be considered.     We also discussed signs of a worsening problem that she should seek being evaluated.          Respectfully,     Michael Wilson, MPAS, NIURKA  M  [facility]  [facility]  Tel: [phone]      Exam, imaging, and plan reviewed by Dr. Zavala.       Again, thank you for allowing me to participate in the care of your patient.        Sincerely,        Ashok Wilson PA-C

## 2022-09-24 ENCOUNTER — HEALTH MAINTENANCE LETTER (OUTPATIENT)
Age: 22
End: 2022-09-24

## 2022-10-25 NOTE — TELEPHONE ENCOUNTER
Author called patient per physician request.     Patient states that she is feeling better. Patient states that she has never met with PT. Patient is not wanting to move forward with PT at this time.

## 2022-10-25 NOTE — TELEPHONE ENCOUNTER
Please call Patricio back. I see she saw the neurosurgeon, however, both the PT appointments were cancelled. Is she well now, without back pain, or is she not wanting to do PT?

## 2022-11-01 ENCOUNTER — MYC MEDICAL ADVICE (OUTPATIENT)
Dept: FAMILY MEDICINE | Facility: CLINIC | Age: 22
End: 2022-11-01

## 2022-11-02 NOTE — TELEPHONE ENCOUNTER
Clinic RN reached out to patient in response to [employer] message attempting to triage.  However, no answer.  Message left on vm with clinic number provided.    TAVON MiramontesN, RN  [facility]

## 2022-11-03 NOTE — TELEPHONE ENCOUNTER
Clinic RN attempt to reach out to patient x 2 to triage symptoms. However, call went straight to .  Message left request a call back with clinic number provided.    TAVON MiramontesN, RN  [facility]

## 2022-11-04 NOTE — TELEPHONE ENCOUNTER
Acknowledged.  Patient message via MyChart symptoms subsided.    ATVON MiramontesN, RN  [facility]

## 2022-11-11 ENCOUNTER — OFFICE VISIT (OUTPATIENT)
Dept: OTOLARYNGOLOGY | Facility: CLINIC | Age: 22
End: 2022-11-11
Payer: COMMERCIAL

## 2022-11-11 ENCOUNTER — OFFICE VISIT (OUTPATIENT)
Dept: AUDIOLOGY | Facility: CLINIC | Age: 22
End: 2022-11-11
Payer: COMMERCIAL

## 2022-11-11 DIAGNOSIS — H90.0 CONDUCTIVE HEARING LOSS, BILATERAL: Primary | ICD-10-CM

## 2022-11-11 DIAGNOSIS — H65.91 MEE (MIDDLE EAR EFFUSION), RIGHT: Primary | ICD-10-CM

## 2022-11-11 PROCEDURE — 99214 OFFICE O/P EST MOD 30 MIN: CPT | Performed by: OTOLARYNGOLOGY

## 2022-11-11 PROCEDURE — 92567 TYMPANOMETRY: CPT | Performed by: AUDIOLOGIST

## 2022-11-11 RX ORDER — METHYLPREDNISOLONE 4 MG
TABLET, DOSE PACK ORAL
Qty: 21 TABLET | Refills: 0 | Status: SHIPPED | OUTPATIENT
Start: 2022-11-11 | End: 2023-03-09

## 2022-11-11 NOTE — LETTER
11/11/2022         RE: Dena Dominguez  [address] [place] [address]        Dear Colleague,    Thank you for referring your patient, Dena Dominguez, to the [facility]. Please see a copy of my visit note below.    CHIEF COMPLAINT:  Patient presents with:  Follow Up: Left ear tube check doing well now right ear feels plugged up been going on since Monday was really painful when it started now on and off since then         HISTORY OF PRESENT ILLNESS    Dena was seen in follow up after previous 8/12/2022 visit for ear tube check.  Right ear has felt plugged recent.  Just getting over URI/flu.  No dizziness.  Reports tinnitus in right ear.         REVIEW OF SYSTEMS    Review of Systems: a 10-system review is reviewed at this encounter.  See scanned document.       Allergies   Allergen Reactions     No Known Drug Allergies Unknown           PHYSICAL EXAM:        HEAD: Normal appearance and symmetry:  No cutaneous lesions.      EARS:   Auricles normal    Right ear:  TM intact (? Partial effusion)  Left ear:  Patent tube, C/D/I     NOSE:    Dorsum:   straight       ORAL CAVITY/OROPHARYNX:    Lips:  Normal.     NECK:  Trachea:  midline     NEURO:   Alert and Oriented    GAIT AND STATION:  normal     RESPIRATORY:   Symmetry and Respiratory effort    PSYCH:   normal mood and affect    SKIN:  warm and dry         IMPRESSION:   Encounter Diagnosis   Name Primary?     NATASHA (middle ear effusion), right Yes            RECOMMENDATIONS:    No orders of the defined types were placed in this encounter.     Orders Placed This Encounter   Medications     methylPREDNISolone (MEDROL DOSEPAK) 4 MG tablet therapy pack     Sig: Follow Package Directions     Dispense:  21 tablet     Refill:  0     Return visit 6 months for ear tube check      Again, thank you for allowing me to participate in the care of your patient.        Sincerely,        Chidi Canela MD

## 2022-11-11 NOTE — PROGRESS NOTES
AUDIOLOGY REPORT    SUMMARY: Audiology visit completed.     ENT add on. Tympanograms and distortion product otoacoustic emissions (DPOAEs) only.     Otoscopy: Right: Non-occluding cerumen. Left: Tube visualized.    Tympanograms:   RIGHT: Significant negative pressure   LEFT: Flat with large ear canal volume, consistent with patent PE tube.    DPOAEs:   RIGHT: Present and/or reduced from 8098-8525 Hz.    LEFT: Present at 43435 Hz and from 0174-2411 Hz.     Middle ear dysfunction may be impacting the presence of DPOAEs.     RECOMMENDATIONS: Follow-up with ENT.    Bhavin Guerrero, CCC-A  [place] Licensed Audiologist #[id number]

## 2022-11-11 NOTE — PROGRESS NOTES
CHIEF COMPLAINT:  Patient presents with:  Follow Up: Left ear tube check doing well now right ear feels plugged up been going on since Monday was really painful when it started now on and off since then         HISTORY OF PRESENT ILLNESS    Dena was seen in follow up after previous 8/12/2022 visit for ear tube check.  Right ear has felt plugged recent.  Just getting over URI/flu.  No dizziness.  Reports tinnitus in right ear.         REVIEW OF SYSTEMS    Review of Systems: a 10-system review is reviewed at this encounter.  See scanned document.       Allergies   Allergen Reactions     No Known Drug Allergies Unknown           PHYSICAL EXAM:        HEAD: Normal appearance and symmetry:  No cutaneous lesions.      EARS:   Auricles normal    Right ear:  TM intact (? Partial effusion)  Left ear:  Patent tube, C/D/I     NOSE:    Dorsum:   straight       ORAL CAVITY/OROPHARYNX:    Lips:  Normal.     NECK:  Trachea:  midline     NEURO:   Alert and Oriented    GAIT AND STATION:  normal     RESPIRATORY:   Symmetry and Respiratory effort    PSYCH:   normal mood and affect    SKIN:  warm and dry         IMPRESSION:   Encounter Diagnosis   Name Primary?     NATASHA (middle ear effusion), right Yes            RECOMMENDATIONS:    No orders of the defined types were placed in this encounter.     Orders Placed This Encounter   Medications     methylPREDNISolone (MEDROL DOSEPAK) 4 MG tablet therapy pack     Sig: Follow Package Directions     Dispense:  21 tablet     Refill:  0     Return visit 6 months for ear tube check

## 2022-12-11 NOTE — TELEPHONE ENCOUNTER
Patient Returning Call  Reason for call:  Kezia Simpson calling back  Information relayed to patient:  Relayed below message from provider  Patient has additional questions:  No  If YES, what are your questions/concerns:  none  Okay to leave a detailed message?: No call back needed   RN attempted to call parent to inform her that Efren's appointment with Dr Cynthia Cruz needs to be rescheduled but mail box is full unable to let a message  RN sent an e-mail to the e-mail on file

## 2023-03-08 ENCOUNTER — OFFICE VISIT (OUTPATIENT)
Dept: FAMILY MEDICINE | Facility: CLINIC | Age: 23
End: 2023-03-08
Payer: COMMERCIAL

## 2023-03-08 ENCOUNTER — TELEPHONE (OUTPATIENT)
Dept: FAMILY MEDICINE | Facility: CLINIC | Age: 23
End: 2023-03-08

## 2023-03-08 VITALS
SYSTOLIC BLOOD PRESSURE: 120 MMHG | WEIGHT: 198 LBS | DIASTOLIC BLOOD PRESSURE: 68 MMHG | HEART RATE: 80 BPM | TEMPERATURE: 98.7 F | OXYGEN SATURATION: 99 % | HEIGHT: 67 IN | RESPIRATION RATE: 14 BRPM | BODY MASS INDEX: 31.08 KG/M2

## 2023-03-08 DIAGNOSIS — Z30.09 GENERAL COUNSELING FOR PRESCRIPTION OF ORAL CONTRACEPTIVES: Primary | ICD-10-CM

## 2023-03-08 DIAGNOSIS — G47.00 PERSISTENT INSOMNIA: ICD-10-CM

## 2023-03-08 DIAGNOSIS — R06.89 GASPING FOR BREATH: ICD-10-CM

## 2023-03-08 PROCEDURE — 99214 OFFICE O/P EST MOD 30 MIN: CPT | Performed by: FAMILY MEDICINE

## 2023-03-08 RX ORDER — DESOGESTREL AND ETHINYL ESTRADIOL 0.15-0.03
1 KIT ORAL DAILY
Qty: 28 TABLET | Refills: 11 | Status: SHIPPED | OUTPATIENT
Start: 2023-03-08 | End: 2023-10-31

## 2023-03-08 ASSESSMENT — ANXIETY QUESTIONNAIRES
3. WORRYING TOO MUCH ABOUT DIFFERENT THINGS: SEVERAL DAYS
5. BEING SO RESTLESS THAT IT IS HARD TO SIT STILL: SEVERAL DAYS
1. FEELING NERVOUS, ANXIOUS, OR ON EDGE: SEVERAL DAYS
2. NOT BEING ABLE TO STOP OR CONTROL WORRYING: SEVERAL DAYS
6. BECOMING EASILY ANNOYED OR IRRITABLE: SEVERAL DAYS
IF YOU CHECKED OFF ANY PROBLEMS ON THIS QUESTIONNAIRE, HOW DIFFICULT HAVE THESE PROBLEMS MADE IT FOR YOU TO DO YOUR WORK, TAKE CARE OF THINGS AT HOME, OR GET ALONG WITH OTHER PEOPLE: SOMEWHAT DIFFICULT
GAD7 TOTAL SCORE: 7
GAD7 TOTAL SCORE: 7
4. TROUBLE RELAXING: SEVERAL DAYS
7. FEELING AFRAID AS IF SOMETHING AWFUL MIGHT HAPPEN: SEVERAL DAYS
GAD7 TOTAL SCORE: 7
8. IF YOU CHECKED OFF ANY PROBLEMS, HOW DIFFICULT HAVE THESE MADE IT FOR YOU TO DO YOUR WORK, TAKE CARE OF THINGS AT HOME, OR GET ALONG WITH OTHER PEOPLE?: SOMEWHAT DIFFICULT
7. FEELING AFRAID AS IF SOMETHING AWFUL MIGHT HAPPEN: SEVERAL DAYS

## 2023-03-08 ASSESSMENT — PATIENT HEALTH QUESTIONNAIRE - PHQ9
SUM OF ALL RESPONSES TO PHQ QUESTIONS 1-9: 6
SUM OF ALL RESPONSES TO PHQ QUESTIONS 1-9: 6
10. IF YOU CHECKED OFF ANY PROBLEMS, HOW DIFFICULT HAVE THESE PROBLEMS MADE IT FOR YOU TO DO YOUR WORK, TAKE CARE OF THINGS AT HOME, OR GET ALONG WITH OTHER PEOPLE: SOMEWHAT DIFFICULT

## 2023-03-08 NOTE — PROGRESS NOTES
"  Assessment & Plan     General counseling for prescription of oral contraceptives  She's going to try taking these, so she is protected against pregnancy if her current relationship begins to include sex. I also encouraged her to use a condom to decrease STI risk .  - desogestrel-ethinyl estradiol (APRI) 0.15-30 MG-MCG tablet  Dispense: 28 tablet; Refill: 11    Gasping for breath  She wakes at night sometimes gasping for breath. She is unsure if she stops breathing (apnea) or if she has nightmares  - Adult Sleep Eval & Management  Referral    Persistent insomnia  Refer for sleep eval  - Adult Sleep Eval & Management  Referral      Review of external notes as documented elsewhere in note  Prescription drug management  30 minutes spent on the date of the encounter doing chart review, history and exam, documentation and further activities per the note     BMI:   Estimated body mass index is 31.11 kg/m  as calculated from the following:    Height as of this encounter: 1.699 m (5' 6.89\").    Weight as of this encounter: 89.8 kg (198 lb).   Weight management plan: Discussed healthy diet and exercise guidelines    Return in about 3 months (around 6/8/2023) for Routine preventive.    Kamryn Gabriel MD  [facility] CLEOPATRA Fernandes is a 22 year old, presenting for the following health issues:  sleeping problems      History of Present Illness       Mental Health Follow-up:  Patient presents to follow-up on Depression & Anxiety.Patient's depression since last visit has been:  Better  The patient is not having other symptoms associated with depression.  Patient's anxiety since last visit has been:  Medium  The patient is having other symptoms associated with anxiety.  Any significant life events: No  Patient is feeling anxious or having panic attacks.  Patient has no concerns about alcohol or drug use.    Reason for visit:  Stop breathing in sleep  Symptom onset:  3-4 " weeks ago  Symptoms include:  Wake up gasping for air  Symptom intensity:  Mild  Symptom progression:  Improving  Had these symptoms before:  No  What makes it worse:  No  What makes it better:  No    She eats 0-1 servings of fruits and vegetables daily.She consumes 1 sweetened beverage(s) daily.She exercises with enough effort to increase her heart rate 9 or less minutes per day.  She exercises with enough effort to increase her heart rate 3 or less days per week.   She is taking medications regularly.    Today's PHQ-9         PHQ-9 Total Score: 6    PHQ-9 Q9 Thoughts of better off dead/self-harm past 2 weeks :   Not at all    How difficult have these problems made it for you to do your work, take care of things at home, or get along with other people: Somewhat difficult  Today's MIRYAM-7 Score: 7     In psychological therapy  Physical sensations trigger anxiety. Works at relaxation  Exercise - not yet but it is a goal    Sleep - wakes in the middle of the night gasping for air. Once woke up choking.  Has not happened in a couple weeks. This made anxiety worse.   Happened when she was on her back.     Birth control options -  Reviewed many of the current options.     Review of Systems         Objective    LMP 03/05/2023 (Exact Date)   There is no height or weight on file to calculate BMI.  Physical Exam   GENERAL: alert, no distress and over weight  CV: regular rate and rhythm, normal S1 S2, no S3 or S4, no murmur, click or rub, no peripheral edema and peripheral pulses strong  MS: no gross musculoskeletal defects noted, no edema  SKIN: no suspicious lesions or rashes  PSYCH: mentation appears normal, affect flat, judgement and insight intact and appearance well groomed

## 2023-04-01 DIAGNOSIS — F43.22 ADJUSTMENT DISORDER WITH ANXIOUS MOOD: ICD-10-CM

## 2023-04-01 DIAGNOSIS — F33.1 MODERATE RECURRENT MAJOR DEPRESSION (H): ICD-10-CM

## 2023-04-01 DIAGNOSIS — F41.0 PANIC ATTACK: ICD-10-CM

## 2023-04-01 DIAGNOSIS — R06.89 GASPING FOR BREATH: Primary | ICD-10-CM

## 2023-04-01 RX ORDER — ESCITALOPRAM OXALATE 10 MG/1
10 TABLET ORAL DAILY
Qty: 30 TABLET | Refills: 11 | Status: SHIPPED | OUTPATIENT
Start: 2023-04-01 | End: 2023-10-31

## 2023-04-01 RX ORDER — PROPRANOLOL HYDROCHLORIDE 10 MG/1
10 TABLET ORAL 3 TIMES DAILY
Qty: 30 TABLET | Refills: 1 | Status: SHIPPED | OUTPATIENT
Start: 2023-04-01 | End: 2023-10-31

## 2023-04-01 RX ORDER — HYDROXYZINE HYDROCHLORIDE 25 MG/1
25 TABLET, FILM COATED ORAL EVERY 6 HOURS PRN
Qty: 45 TABLET | Refills: 1 | Status: SHIPPED | OUTPATIENT
Start: 2023-04-01 | End: 2023-10-31

## 2023-04-25 ENCOUNTER — E-VISIT (OUTPATIENT)
Dept: FAMILY MEDICINE | Facility: CLINIC | Age: 23
End: 2023-04-25
Payer: COMMERCIAL

## 2023-04-25 DIAGNOSIS — R21 RASH: Primary | ICD-10-CM

## 2023-04-25 PROCEDURE — 99207 PR NON-BILLABLE SERV PER CHARTING: CPT | Performed by: FAMILY MEDICINE

## 2023-04-26 NOTE — PATIENT INSTRUCTIONS
Dear Dena Dominguez?     After reviewing your responses, I am unable to make a diagnosis that can be treated online.    You will not be charged for this eVisit.     We are dedicated to helping you achieve your best health and would like to see you in one of our many clinic locations - a primary care provider would be ideal for your concern.    Please use [employer] to schedule a visit with a provider or call [phone]) to schedule at any of our locations.    Thanks for choosing?us?as your health care partner,?   ?   Kamryn Gabriel MD?

## 2023-05-08 ENCOUNTER — HEALTH MAINTENANCE LETTER (OUTPATIENT)
Age: 23
End: 2023-05-08

## 2023-05-09 ENCOUNTER — OFFICE VISIT (OUTPATIENT)
Dept: FAMILY MEDICINE | Facility: CLINIC | Age: 23
End: 2023-05-09
Payer: COMMERCIAL

## 2023-05-09 ENCOUNTER — LAB REQUISITION (OUTPATIENT)
Dept: LAB | Facility: CLINIC | Age: 23
End: 2023-05-09
Payer: COMMERCIAL

## 2023-05-09 VITALS
OXYGEN SATURATION: 98 % | RESPIRATION RATE: 14 BRPM | DIASTOLIC BLOOD PRESSURE: 74 MMHG | BODY MASS INDEX: 29.19 KG/M2 | HEIGHT: 67 IN | WEIGHT: 186 LBS | HEART RATE: 100 BPM | SYSTOLIC BLOOD PRESSURE: 127 MMHG | TEMPERATURE: 99.4 F

## 2023-05-09 DIAGNOSIS — Z12.4 ENCOUNTER FOR SCREENING FOR MALIGNANT NEOPLASM OF CERVIX: ICD-10-CM

## 2023-05-09 DIAGNOSIS — Z13.220 ENCOUNTER FOR SCREENING FOR LIPOID DISORDERS: ICD-10-CM

## 2023-05-09 DIAGNOSIS — Z11.3 SCREENING FOR STDS (SEXUALLY TRANSMITTED DISEASES): ICD-10-CM

## 2023-05-09 DIAGNOSIS — J00 ACUTE NASOPHARYNGITIS: ICD-10-CM

## 2023-05-09 DIAGNOSIS — Z11.4 SCREENING FOR HIV (HUMAN IMMUNODEFICIENCY VIRUS): ICD-10-CM

## 2023-05-09 DIAGNOSIS — J10.1 INFLUENZA A: Primary | ICD-10-CM

## 2023-05-09 DIAGNOSIS — Z01.419 ENCOUNTER FOR GYNECOLOGICAL EXAMINATION (GENERAL) (ROUTINE) WITHOUT ABNORMAL FINDINGS: ICD-10-CM

## 2023-05-09 DIAGNOSIS — Z13.220 SCREENING FOR LIPID DISORDERS: ICD-10-CM

## 2023-05-09 DIAGNOSIS — Z11.59 NEED FOR HEPATITIS C SCREENING TEST: ICD-10-CM

## 2023-05-09 LAB
ALBUMIN UR-MCNC: NEGATIVE MG/DL
APPEARANCE UR: CLEAR
BACTERIA #/AREA URNS HPF: ABNORMAL /HPF
BASOPHILS # BLD MANUAL: 0 10E3/UL (ref 0–0.2)
BASOPHILS NFR BLD MANUAL: 0 %
BILIRUB UR QL STRIP: NEGATIVE
CHOLEST SERPL-MCNC: 200 MG/DL
COLOR UR AUTO: ABNORMAL
DEPRECATED S PYO AG THROAT QL EIA: NEGATIVE
EOSINOPHIL # BLD MANUAL: 0.2 10E3/UL (ref 0–0.7)
EOSINOPHIL NFR BLD MANUAL: 3 %
ERYTHROCYTE [DISTWIDTH] IN BLOOD BY AUTOMATED COUNT: 15.2 % (ref 10–15)
FLUAV AG SPEC QL IA: POSITIVE
FLUBV AG SPEC QL IA: NEGATIVE
GLUCOSE UR STRIP-MCNC: NEGATIVE MG/DL
GROUP A STREP BY PCR: NOT DETECTED
HBA1C MFR BLD: 5.7 %
HCT VFR BLD AUTO: 38 % (ref 35–47)
HCV AB SERPL QL IA: NONREACTIVE
HDLC SERPL-MCNC: 25 MG/DL
HGB BLD-MCNC: 11.4 G/DL (ref 11.7–15.7)
HGB BLD-MCNC: 11.7 G/DL (ref 11.7–15.7)
HGB UR QL STRIP: NEGATIVE
HIV 1+2 AB+HIV1 P24 AG SERPL QL IA: NONREACTIVE
KETONES UR STRIP-MCNC: NEGATIVE MG/DL
LDLC SERPL CALC-MCNC: 135 MG/DL
LEUKOCYTE ESTERASE UR QL STRIP: NEGATIVE
LYMPHOCYTES # BLD MANUAL: 5 10E3/UL (ref 0.8–5.3)
LYMPHOCYTES NFR BLD MANUAL: 62 %
MCH RBC QN AUTO: 23.1 PG (ref 26.5–33)
MCHC RBC AUTO-ENTMCNC: 30.8 G/DL (ref 31.5–36.5)
MCV RBC AUTO: 75 FL (ref 78–100)
MONOCYTES # BLD MANUAL: 0.6 10E3/UL (ref 0–1.3)
MONOCYTES NFR BLD AUTO: NEGATIVE %
MONOCYTES NFR BLD MANUAL: 7 %
MUCOUS THREADS #/AREA URNS LPF: PRESENT /LPF
NEUTROPHILS # BLD MANUAL: 2.3 10E3/UL (ref 1.6–8.3)
NEUTROPHILS NFR BLD MANUAL: 28 %
NITRATE UR QL: NEGATIVE
NONHDLC SERPL-MCNC: 175 MG/DL
PH UR STRIP: 5.5 [PH] (ref 5–7)
PLAT MORPH BLD: NORMAL
PLATELET # BLD AUTO: 320 10E3/UL (ref 150–450)
RBC # BLD AUTO: 5.06 10E6/UL (ref 3.8–5.2)
RBC MORPH BLD: NORMAL
RBC URINE: <1 /HPF
SP GR UR STRIP: 1.01 (ref 1–1.03)
SQUAMOUS EPITHELIAL: 2 /HPF
TRIGL SERPL-MCNC: 199 MG/DL
UROBILINOGEN UR STRIP-MCNC: NORMAL MG/DL
WBC # BLD AUTO: 8.1 10E3/UL (ref 4–11)
WBC URINE: 2 /HPF

## 2023-05-09 PROCEDURE — G0145 SCR C/V CYTO,THINLAYER,RESCR: HCPCS | Mod: ORL | Performed by: OBSTETRICS & GYNECOLOGY

## 2023-05-09 PROCEDURE — 85027 COMPLETE CBC AUTOMATED: CPT | Performed by: FAMILY MEDICINE

## 2023-05-09 PROCEDURE — 85018 HEMOGLOBIN: CPT | Mod: ORL | Performed by: OBSTETRICS & GYNECOLOGY

## 2023-05-09 PROCEDURE — 87389 HIV-1 AG W/HIV-1&-2 AB AG IA: CPT | Performed by: FAMILY MEDICINE

## 2023-05-09 PROCEDURE — 86308 HETEROPHILE ANTIBODY SCREEN: CPT | Performed by: FAMILY MEDICINE

## 2023-05-09 PROCEDURE — 83036 HEMOGLOBIN GLYCOSYLATED A1C: CPT | Mod: ORL | Performed by: OBSTETRICS & GYNECOLOGY

## 2023-05-09 PROCEDURE — 36415 COLL VENOUS BLD VENIPUNCTURE: CPT | Performed by: FAMILY MEDICINE

## 2023-05-09 PROCEDURE — 86803 HEPATITIS C AB TEST: CPT | Performed by: FAMILY MEDICINE

## 2023-05-09 PROCEDURE — 85007 BL SMEAR W/DIFF WBC COUNT: CPT | Performed by: FAMILY MEDICINE

## 2023-05-09 PROCEDURE — 99214 OFFICE O/P EST MOD 30 MIN: CPT | Performed by: FAMILY MEDICINE

## 2023-05-09 PROCEDURE — 87804 INFLUENZA ASSAY W/OPTIC: CPT | Performed by: FAMILY MEDICINE

## 2023-05-09 PROCEDURE — 87086 URINE CULTURE/COLONY COUNT: CPT | Mod: ORL | Performed by: OBSTETRICS & GYNECOLOGY

## 2023-05-09 PROCEDURE — 80061 LIPID PANEL: CPT | Performed by: FAMILY MEDICINE

## 2023-05-09 PROCEDURE — 87651 STREP A DNA AMP PROBE: CPT | Performed by: FAMILY MEDICINE

## 2023-05-09 PROCEDURE — 81001 URINALYSIS AUTO W/SCOPE: CPT | Mod: ORL | Performed by: OBSTETRICS & GYNECOLOGY

## 2023-05-09 ASSESSMENT — PATIENT HEALTH QUESTIONNAIRE - PHQ9
SUM OF ALL RESPONSES TO PHQ QUESTIONS 1-9: 7
SUM OF ALL RESPONSES TO PHQ QUESTIONS 1-9: 7
10. IF YOU CHECKED OFF ANY PROBLEMS, HOW DIFFICULT HAVE THESE PROBLEMS MADE IT FOR YOU TO DO YOUR WORK, TAKE CARE OF THINGS AT HOME, OR GET ALONG WITH OTHER PEOPLE: SOMEWHAT DIFFICULT

## 2023-05-09 ASSESSMENT — ENCOUNTER SYMPTOMS: SORE THROAT: 1

## 2023-05-09 NOTE — PROGRESS NOTES
Assessment & Plan     Influenza A  She clearly has this, per lab and symptoms. She'll rest and see how the next 2 days go. If not better or if any worse, I'll add amox for sinusitis. If improving, then she will just keep resting until she is well.    Acute nasopharyngitis  These are her main symptoms, which she says are improved now.  - CBC with platelets and differential  - Mononucleosis screen  - Influenza A & B Antigen - Clinic Collect  - Streptococcus A Rapid Screen w/Reflex to PCR - Clinic Collect  - CBC with platelets and differential  - Mononucleosis screen  - Group A Streptococcus PCR Throat Swab    Screening for HIV (human immunodeficiency virus)  Screening only  - HIV Antigen Antibody Combo  - HIV Antigen Antibody Combo    Need for hepatitis C screening test  Screening only  - Hepatitis C Screen Reflex to HCV RNA Quant and Genotype  - Hepatitis C Screen Reflex to HCV RNA Quant and Genotype    Screening for lipid disorders  Recheck from 4 years ago  - Lipid panel reflex to direct LDL Non-fasting  - Lipid panel reflex to direct LDL Non-fasting      Review of external notes as documented elsewhere in note  Ordering of each unique test  Prescription drug management  30 minutes spent by me on the date of the encounter doing chart review, history and exam, documentation and further activities per the note    Kamryn Gabriel MD  [facility] CLEOPATRA Fernandes is a 23 year old, presenting for the following health issues:  Nasal Congestion and Pharyngitis (Went to  on 5/7/2023)        5/9/2023     6:54 AM   Additional Questions   Roomed by ashu     Pharyngitis     History of Present Illness       Reason for visit:  Sore throat ans stuffy nose  Symptom onset:  3-7 days ago    She eats 0-1 servings of fruits and vegetables daily.She consumes 1 sweetened beverage(s) daily.She exercises with enough effort to increase her heart rate 9 or less minutes per day.  She exercises  "with enough effort to increase her heart rate 3 or less days per week.   She is taking medications regularly.    Today's PHQ-9         PHQ-9 Total Score: 7    PHQ-9 Q9 Thoughts of better off dead/self-harm past 2 weeks :   Not at all    How difficult have these problems made it for you to do your work, take care of things at home, or get along with other people: Somewhat difficult     Very sore throat  At [employer] - was checked for strep and was negative -     No known exposures  Today is day 6 - improving some (throat is)    Is no longer with her boyfriend    Review of Systems   HENT: Positive for sore throat.            Objective    /74   Pulse 100   Temp 99.4  F (37.4  C) (Oral)   Resp 14   Ht 1.699 m (5' 6.89\")   Wt 84.4 kg (186 lb)   LMP 05/04/2023 (Exact Date)   SpO2 98%   BMI 29.23 kg/m    Body mass index is 29.23 kg/m .  Physical Exam   GENERAL: alert, no distress, over weight, fatigued and speaks with obvious congestion  EYES: Eyes grossly normal to inspection, PERRL and conjunctivae and sclerae normal  HENT: normal cephalic/atraumatic, ear canals and TM's normal, nasal mucosa edematous , rhinorrhea clear, yellow and thick, oral mucous membranes moist and pharynx erythematous with yellow mucous post nasal drip, little swelling  NECK: cervical adenopathy bilaterally but she does not feel tenderness or swelling and no asymmetry, masses, or scars  RESP: lungs clear to auscultation - no rales, rhonchi or wheezes  CV: regular rate and rhythm, normal S1 S2, no S3 or S4, no murmur, click or rub, no peripheral edema and peripheral pulses strong  SKIN: no suspicious lesions or rashes    Results for orders placed or performed in visit on 05/09/23 (from the past 24 hour(s))   Influenza A & B Antigen - Clinic Collect    Specimen: Nose; Swab   Result Value Ref Range    Influenza A antigen Positive (A) Negative    Influenza B antigen Negative Negative    Narrative    Test results must be correlated with " clinical data. If necessary, results should be confirmed by a molecular assay or viral culture.   Streptococcus A Rapid Screen w/Reflex to PCR - Clinic Collect    Specimen: Throat; Swab   Result Value Ref Range    Group A Strep antigen Negative Negative   CBC with platelets and differential    Narrative    The following orders were created for panel order CBC with platelets and differential.  Procedure                               Abnormality         Status                     ---------                               -----------         ------                     CBC with platelets and d...[[id number]]                      In process                   Please view results for these tests on the individual orders.   Mononucleosis screen   Result Value Ref Range    Mononucleosis Screen Negative Negative

## 2023-05-10 LAB
CHOLEST SERPL-MCNC: 203 MG/DL
HDLC SERPL-MCNC: 25 MG/DL
LDLC SERPL CALC-MCNC: 138 MG/DL
NONHDLC SERPL-MCNC: 178 MG/DL
TRIGL SERPL-MCNC: 200 MG/DL

## 2023-05-11 LAB
BACTERIA UR CULT: NORMAL
BKR LAB AP GYN ADEQUACY: NORMAL
BKR LAB AP GYN INTERPRETATION: NORMAL
BKR LAB AP HPV REFLEX: NORMAL
BKR LAB AP LMP: NORMAL
BKR LAB AP PREVIOUS ABNL DX: NORMAL
BKR LAB AP PREVIOUS ABNORMAL: NORMAL
PATH REPORT.COMMENTS IMP SPEC: NORMAL
PATH REPORT.COMMENTS IMP SPEC: NORMAL
PATH REPORT.RELEVANT HX SPEC: NORMAL

## 2023-05-11 NOTE — PROGRESS NOTES
CHIEF COMPLAINT:  Patient presents with:  Ear Tube Follow Up: Lt ear, per pt no concerns          HISTORY OF PRESENT ILLNESS    Dena was seen in follow up after previous 11/11/2022 visit for ear tube check. No pain or discharge. She is planning on swimming this weekend (chlorinated).     My previous note:      NATASHA (middle ear effusion), right Yes        RECOMMENDATIONS:           REVIEW OF SYSTEMS    Review of Systems: a 10-system review is reviewed at this encounter.  See scanned document.       Allergies   Allergen Reactions     No Known Drug Allergy Unknown           PHYSICAL EXAM:        HEAD: Normal appearance and symmetry:  No cutaneous lesions.      EARS:        RIGHT:  wnl   LEFT:    Patent tube C/D/I     NOSE:    Dorsum:   straight       ORAL CAVITY/OROPHARYNX:    Lips:  Normal.     NECK:  Trachea:  midline     NEURO:   Alert and Oriented    GAIT AND STATION:  normal     RESPIRATORY:   Symmetry and Respiratory effort    PSYCH:   normal mood and affect    SKIN:  warm and dry         IMPRESSION:   Encounter Diagnoses   Name Primary?     S/P myringotomy with insertion of tube Yes     Patent pressure equalization (PE) tube on right side             RECOMMENDATIONS:    Water precautions for lake swimming  Return visit 6 months

## 2023-05-12 ENCOUNTER — OFFICE VISIT (OUTPATIENT)
Dept: OTOLARYNGOLOGY | Facility: CLINIC | Age: 23
End: 2023-05-12
Payer: COMMERCIAL

## 2023-05-12 DIAGNOSIS — Z96.22 S/P MYRINGOTOMY WITH INSERTION OF TUBE: Primary | ICD-10-CM

## 2023-05-12 DIAGNOSIS — Z96.22 PATENT PRESSURE EQUALIZATION (PE) TUBE ON RIGHT SIDE: ICD-10-CM

## 2023-05-12 PROCEDURE — 99213 OFFICE O/P EST LOW 20 MIN: CPT | Performed by: OTOLARYNGOLOGY

## 2023-05-12 RX ORDER — HYDROXYZINE HYDROCHLORIDE 25 MG/1
1 TABLET, FILM COATED ORAL EVERY 6 HOURS
COMMUNITY
End: 2023-11-03

## 2023-05-12 NOTE — LETTER
5/12/2023         RE: Dena Dominguez  [address] [address]        Dear Colleague,    Thank you for referring your patient, Dena Dominguez, to the [facility]. Please see a copy of my visit note below.    CHIEF COMPLAINT:  Patient presents with:  Ear Tube Follow Up: Lt ear, per pt no concerns          HISTORY OF PRESENT ILLNESS    Dena was seen in follow up after previous 11/11/2022 visit for ear tube check. No pain or discharge. She is planning on swimming this weekend (chlorinated).     My previous note:      NATASHA (middle ear effusion), right Yes        RECOMMENDATIONS:           REVIEW OF SYSTEMS    Review of Systems: a 10-system review is reviewed at this encounter.  See scanned document.       Allergies   Allergen Reactions     No Known Drug Allergy Unknown           PHYSICAL EXAM:        HEAD: Normal appearance and symmetry:  No cutaneous lesions.      EARS:        RIGHT:  wnl   LEFT:    Patent tube C/D/I     NOSE:    Dorsum:   straight       ORAL CAVITY/OROPHARYNX:    Lips:  Normal.     NECK:  Trachea:  midline     NEURO:   Alert and Oriented    GAIT AND STATION:  normal     RESPIRATORY:   Symmetry and Respiratory effort    PSYCH:   normal mood and affect    SKIN:  warm and dry         IMPRESSION:   Encounter Diagnoses   Name Primary?     S/P myringotomy with insertion of tube Yes     Patent pressure equalization (PE) tube on right side             RECOMMENDATIONS:    Water precautions for lake swimming  Return visit 6 months      Again, thank you for allowing me to participate in the care of your patient.        Sincerely,        Chidi Canela MD

## 2023-10-01 ENCOUNTER — MYC MEDICAL ADVICE (OUTPATIENT)
Dept: FAMILY MEDICINE | Facility: CLINIC | Age: 23
End: 2023-10-01
Payer: COMMERCIAL

## 2023-10-31 ENCOUNTER — OFFICE VISIT (OUTPATIENT)
Dept: FAMILY MEDICINE | Facility: CLINIC | Age: 23
End: 2023-10-31
Payer: COMMERCIAL

## 2023-10-31 VITALS
OXYGEN SATURATION: 100 % | DIASTOLIC BLOOD PRESSURE: 76 MMHG | SYSTOLIC BLOOD PRESSURE: 120 MMHG | HEART RATE: 70 BPM | WEIGHT: 167 LBS | BODY MASS INDEX: 25.31 KG/M2 | HEIGHT: 68 IN | TEMPERATURE: 98.7 F | RESPIRATION RATE: 16 BRPM

## 2023-10-31 DIAGNOSIS — R06.89 GASPING FOR BREATH: ICD-10-CM

## 2023-10-31 DIAGNOSIS — Z23 NEED FOR VACCINATION: ICD-10-CM

## 2023-10-31 DIAGNOSIS — R73.09 ELEVATED GLUCOSE: ICD-10-CM

## 2023-10-31 DIAGNOSIS — H53.8 BLURRED VISION: ICD-10-CM

## 2023-10-31 DIAGNOSIS — F41.0 PANIC ATTACK: ICD-10-CM

## 2023-10-31 DIAGNOSIS — F32.9 MAJOR DEPRESSIVE DISORDER, REMISSION STATUS UNSPECIFIED, UNSPECIFIED WHETHER RECURRENT: Primary | ICD-10-CM

## 2023-10-31 DIAGNOSIS — Z11.3 SCREENING FOR STDS (SEXUALLY TRANSMITTED DISEASES): ICD-10-CM

## 2023-10-31 LAB — HBA1C MFR BLD: 5 % (ref 0–5.6)

## 2023-10-31 PROCEDURE — 83036 HEMOGLOBIN GLYCOSYLATED A1C: CPT | Performed by: FAMILY MEDICINE

## 2023-10-31 PROCEDURE — 90715 TDAP VACCINE 7 YRS/> IM: CPT | Performed by: FAMILY MEDICINE

## 2023-10-31 PROCEDURE — 36415 COLL VENOUS BLD VENIPUNCTURE: CPT | Performed by: FAMILY MEDICINE

## 2023-10-31 PROCEDURE — 90471 IMMUNIZATION ADMIN: CPT | Performed by: FAMILY MEDICINE

## 2023-10-31 PROCEDURE — 99214 OFFICE O/P EST MOD 30 MIN: CPT | Mod: 25 | Performed by: FAMILY MEDICINE

## 2023-10-31 RX ORDER — HYDROXYZINE HYDROCHLORIDE 25 MG/1
25 TABLET, FILM COATED ORAL EVERY 8 HOURS PRN
Qty: 45 TABLET | Refills: 3 | Status: SHIPPED | OUTPATIENT
Start: 2023-10-31 | End: 2024-04-16

## 2023-10-31 ASSESSMENT — PATIENT HEALTH QUESTIONNAIRE - PHQ9
SUM OF ALL RESPONSES TO PHQ QUESTIONS 1-9: 8
SUM OF ALL RESPONSES TO PHQ QUESTIONS 1-9: 8
10. IF YOU CHECKED OFF ANY PROBLEMS, HOW DIFFICULT HAVE THESE PROBLEMS MADE IT FOR YOU TO DO YOUR WORK, TAKE CARE OF THINGS AT HOME, OR GET ALONG WITH OTHER PEOPLE: SOMEWHAT DIFFICULT

## 2023-10-31 ASSESSMENT — ANXIETY QUESTIONNAIRES
IF YOU CHECKED OFF ANY PROBLEMS ON THIS QUESTIONNAIRE, HOW DIFFICULT HAVE THESE PROBLEMS MADE IT FOR YOU TO DO YOUR WORK, TAKE CARE OF THINGS AT HOME, OR GET ALONG WITH OTHER PEOPLE: SOMEWHAT DIFFICULT
7. FEELING AFRAID AS IF SOMETHING AWFUL MIGHT HAPPEN: MORE THAN HALF THE DAYS
6. BECOMING EASILY ANNOYED OR IRRITABLE: SEVERAL DAYS
GAD7 TOTAL SCORE: 11
2. NOT BEING ABLE TO STOP OR CONTROL WORRYING: MORE THAN HALF THE DAYS
1. FEELING NERVOUS, ANXIOUS, OR ON EDGE: MORE THAN HALF THE DAYS
4. TROUBLE RELAXING: SEVERAL DAYS
5. BEING SO RESTLESS THAT IT IS HARD TO SIT STILL: SEVERAL DAYS
GAD7 TOTAL SCORE: 11
3. WORRYING TOO MUCH ABOUT DIFFERENT THINGS: MORE THAN HALF THE DAYS

## 2023-10-31 NOTE — PROGRESS NOTES
"  Assessment & Plan     Major depressive disorder, remission status unspecified, unspecified whether recurrent  Low mood and some panic continues, getting therapy but not taking med. Still does not want to take a daily med    Screening for STDs (sexually transmitted diseases)  Not done. She denies current sexual activity    Elevated glucose  Retest of A1-C finds it NORMAL, at 5.0!  - Hemoglobin A1c  - Hemoglobin A1c    Gasping for breath  She finds hydroxyzine helps prn  - hydrOXYzine (ATARAX) 25 MG tablet  Dispense: 45 tablet; Refill: 3    Panic attack  Again, prn hydroxyzine is her treatment of choice  - hydrOXYzine (ATARAX) 25 MG tablet  Dispense: 45 tablet; Refill: 3    Need for vaccination  given  - TDAP 10-64Y (ADACEL,BOOSTRIX)    Blurred vision  Check vision as she complains of blurred vision if/when she runs    Previously overweight  She was previously overweight but has been exercising, lost 20#+ and now has normal BMI and normal A1-C!      Review of external notes as documented elsewhere in note  Ordering of each unique test  Prescription drug management  33 minutes spent by me on the date of the encounter doing chart review, history and exam, documentation and further activities per the note     BMI:   Estimated body mass index is 25.14 kg/m  as calculated from the following:    Height as of this encounter: 1.736 m (5' 8.35\").    Weight as of this encounter: 75.8 kg (167 lb).     Kamryn Gabriel MD  [facility] CLEOPATRA Fernandes is a 23 year old, presenting for the following health issues:  Recheck Medication and Consult (Other questions)      10/31/2023    11:55 AM   Additional Questions   Roomed by Shari WELLINGTON       History of Present Illness       Mental Health Follow-up:  Patient presents to follow-up on Depression & Anxiety.Patient's depression since last visit has been:  No change  The patient is not having other symptoms associated with depression.  Patient's " "anxiety since last visit has been:  Medium  The patient is having other symptoms associated with anxiety.  Any significant life events: No  Patient is feeling anxious or having panic attacks.  Patient has no concerns about alcohol or drug use.    She eats 2-3 servings of fruits and vegetables daily.She consumes 0 sweetened beverage(s) daily.She exercises with enough effort to increase her heart rate 30 to 60 minutes per day.  She exercises with enough effort to increase her heart rate 5 days per week.   She is taking medications regularly.     Bad headaches -   Neck pain - these were bad - so bad she would get nauseated (no vomiting). Tylenol helps a little. No h/o migraines.    Started exercising - lost weight.  Tried running - vision would become blurry. This happened enough that she stopped running. She does not push herself to go fast, more for distance.    Neck and shoulder pain  - thinks this is caused by work - hunched over an oven/table. [employer].    Sleep - good, 8+ hrs, has been told she snores, but it got better.    Mood - heading into winter/darkness it is hard. Being active helps anxiety.     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  Physical Exam                       "

## 2023-10-31 NOTE — PROGRESS NOTES
Prior to immunization administration, verified patients identity using patient s name and date of birth. Please see Immunization Activity for additional information.     Screening Questionnaire for Adult Immunization    Are you sick today?   No   Do you have allergies to medications, food, a vaccine component or latex?   No   Have you ever had a serious reaction after receiving a vaccination?   No   Do you have a long-term health problem with heart, lung, kidney, or metabolic disease (e.g., diabetes), asthma, a blood disorder, no spleen, complement component deficiency, a cochlear implant, or a spinal fluid leak?  Are you on long-term aspirin therapy?   No   Do you have cancer, leukemia, HIV/AIDS, or any other immune system problem?   No   Do you have a parent, brother, or sister with an immune system problem?   No   In the past 3 months, have you taken medications that affect  your immune system, such as prednisone, other steroids, or anticancer drugs; drugs for the treatment of rheumatoid arthritis, Crohn s disease, or psoriasis; or have you had radiation treatments?   No   Have you had a seizure, or a brain or other nervous system problem?   No   During the past year, have you received a transfusion of blood or blood    products, or been given immune (gamma) globulin or antiviral drug?   No   For women: Are you pregnant or is there a chance you could become       pregnant during the next month?   No   Have you received any vaccinations in the past 4 weeks?   No     Immunization questionnaire answers were all negative.      Patient instructed to remain in clinic for 15 minutes afterwards, and to report any adverse reactions.     Screening performed by Charlie Maxwell RN on 10/31/2023 at 1:00 PM.

## 2023-11-03 PROBLEM — E66.3 OVERWEIGHT: Status: RESOLVED | Noted: 2019-02-07 | Resolved: 2023-11-03

## 2023-11-20 ENCOUNTER — OFFICE VISIT (OUTPATIENT)
Dept: OTOLARYNGOLOGY | Facility: CLINIC | Age: 23
End: 2023-11-20
Payer: COMMERCIAL

## 2023-11-20 DIAGNOSIS — M26.622 ARTHRALGIA OF LEFT TEMPOROMANDIBULAR JOINT: Primary | ICD-10-CM

## 2023-11-20 DIAGNOSIS — Z96.22 PATENT PRESSURE EQUALIZATION (PE) TUBE, LEFT: ICD-10-CM

## 2023-11-20 PROCEDURE — 99214 OFFICE O/P EST MOD 30 MIN: CPT | Performed by: OTOLARYNGOLOGY

## 2023-11-20 NOTE — PROGRESS NOTES
CHIEF COMPLAINT:  Patient presents with:  Ear Problem: Est 6 Month Tube Check, DOS 5/12/23, No concerns at this time          HISTORY OF PRESENT ILLNESS    Dena was seen in follow up after previous 5/12/2023 visit for ear tube check.  She gets occasional ear pain in the left ear for seconds to minutes.   She is aware of clenching during the day and night.         REVIEW OF SYSTEMS    Review of Systems: a 10-system review is reviewed at this encounter.  See scanned document.       Allergies   Allergen Reactions    No Known Drug Allergy Unknown           PHYSICAL EXAM:        HEAD: Normal appearance and symmetry:  No cutaneous lesions.      EARS:        RIGHT:  TM intact normal   LEFT:    patent tube C/D/I    NOSE:    Dorsum:   straight       ORAL CAVITY/OROPHARYNX:    Lips:  Normal.     NECK:  Trachea:  midline     NEURO:   Alert and Oriented    GAIT AND STATION:  normal     RESPIRATORY:   Symmetry and Respiratory effort    PSYCH:   normal mood and affect    SKIN:  warm and dry         IMPRESSION:   Encounter Diagnoses   Name Primary?    Arthralgia of left temporomandibular joint Yes    Patent pressure equalization (PE) tube, left             RECOMMENDATIONS:    Return visit 6 months  Conservational measures for ear pain.

## 2023-11-20 NOTE — LETTER
11/20/2023         RE: Dena Dominguez  [address] [facility] [address]        Dear Colleague,    Thank you for referring your patient, Dena Dominguez, to the [facility]. Please see a copy of my visit note below.    CHIEF COMPLAINT:  Patient presents with:  Ear Problem: Est 6 Month Tube Check, DOS 5/12/23, No concerns at this time          HISTORY OF PRESENT ILLNESS    Dena was seen in follow up after previous 5/12/2023 visit for ear tube check.  She gets occasional ear pain in the left ear for seconds to minutes.   She is aware of clenching during the day and night.         REVIEW OF SYSTEMS    Review of Systems: a 10-system review is reviewed at this encounter.  See scanned document.       Allergies   Allergen Reactions     No Known Drug Allergy Unknown           PHYSICAL EXAM:        HEAD: Normal appearance and symmetry:  No cutaneous lesions.      EARS:        RIGHT:  TM intact normal   LEFT:    patent tube C/D/I    NOSE:    Dorsum:   straight       ORAL CAVITY/OROPHARYNX:    Lips:  Normal.     NECK:  Trachea:  midline     NEURO:   Alert and Oriented    GAIT AND STATION:  normal     RESPIRATORY:   Symmetry and Respiratory effort    PSYCH:   normal mood and affect    SKIN:  warm and dry         IMPRESSION:   Encounter Diagnoses   Name Primary?     Arthralgia of left temporomandibular joint Yes     Patent pressure equalization (PE) tube, left             RECOMMENDATIONS:    Return visit 6 months  Conservational measures for ear pain.       Again, thank you for allowing me to participate in the care of your patient.        Sincerely,        Chidi Canela MD

## 2024-04-16 ENCOUNTER — OFFICE VISIT (OUTPATIENT)
Dept: FAMILY MEDICINE | Facility: CLINIC | Age: 24
End: 2024-04-16
Payer: COMMERCIAL

## 2024-04-16 VITALS
BODY MASS INDEX: 23.08 KG/M2 | WEIGHT: 147.04 LBS | HEART RATE: 63 BPM | TEMPERATURE: 98.3 F | RESPIRATION RATE: 14 BRPM | SYSTOLIC BLOOD PRESSURE: 110 MMHG | HEIGHT: 67 IN | DIASTOLIC BLOOD PRESSURE: 74 MMHG | OXYGEN SATURATION: 100 %

## 2024-04-16 DIAGNOSIS — R06.89 GASPING FOR BREATH: ICD-10-CM

## 2024-04-16 DIAGNOSIS — F41.0 PANIC ATTACK: Primary | ICD-10-CM

## 2024-04-16 DIAGNOSIS — F43.22 ADJUSTMENT DISORDER WITH ANXIOUS MOOD: ICD-10-CM

## 2024-04-16 PROCEDURE — 99214 OFFICE O/P EST MOD 30 MIN: CPT | Performed by: FAMILY MEDICINE

## 2024-04-16 RX ORDER — HYDROXYZINE HYDROCHLORIDE 25 MG/1
25 TABLET, FILM COATED ORAL EVERY 8 HOURS PRN
Qty: 45 TABLET | Refills: 3 | Status: SHIPPED | OUTPATIENT
Start: 2024-04-16 | End: 2024-09-24

## 2024-04-16 ASSESSMENT — ANXIETY QUESTIONNAIRES
IF YOU CHECKED OFF ANY PROBLEMS ON THIS QUESTIONNAIRE, HOW DIFFICULT HAVE THESE PROBLEMS MADE IT FOR YOU TO DO YOUR WORK, TAKE CARE OF THINGS AT HOME, OR GET ALONG WITH OTHER PEOPLE: SOMEWHAT DIFFICULT
7. FEELING AFRAID AS IF SOMETHING AWFUL MIGHT HAPPEN: SEVERAL DAYS
7. FEELING AFRAID AS IF SOMETHING AWFUL MIGHT HAPPEN: SEVERAL DAYS
3. WORRYING TOO MUCH ABOUT DIFFERENT THINGS: SEVERAL DAYS
GAD7 TOTAL SCORE: 7
GAD7 TOTAL SCORE: 7
2. NOT BEING ABLE TO STOP OR CONTROL WORRYING: SEVERAL DAYS
8. IF YOU CHECKED OFF ANY PROBLEMS, HOW DIFFICULT HAVE THESE MADE IT FOR YOU TO DO YOUR WORK, TAKE CARE OF THINGS AT HOME, OR GET ALONG WITH OTHER PEOPLE?: SOMEWHAT DIFFICULT
5. BEING SO RESTLESS THAT IT IS HARD TO SIT STILL: SEVERAL DAYS
GAD7 TOTAL SCORE: 7
4. TROUBLE RELAXING: SEVERAL DAYS
1. FEELING NERVOUS, ANXIOUS, OR ON EDGE: SEVERAL DAYS
6. BECOMING EASILY ANNOYED OR IRRITABLE: SEVERAL DAYS

## 2024-04-16 ASSESSMENT — ENCOUNTER SYMPTOMS: NERVOUS/ANXIOUS: 1

## 2024-04-16 ASSESSMENT — PATIENT HEALTH QUESTIONNAIRE - PHQ9
10. IF YOU CHECKED OFF ANY PROBLEMS, HOW DIFFICULT HAVE THESE PROBLEMS MADE IT FOR YOU TO DO YOUR WORK, TAKE CARE OF THINGS AT HOME, OR GET ALONG WITH OTHER PEOPLE: SOMEWHAT DIFFICULT
SUM OF ALL RESPONSES TO PHQ QUESTIONS 1-9: 9
SUM OF ALL RESPONSES TO PHQ QUESTIONS 1-9: 9

## 2024-04-16 NOTE — PROGRESS NOTES
Assessment & Plan     Panic attack  Patricio says she's not using this med much, but she wants to keep it available. I refilled it  - hydrOXYzine HCl (ATARAX) 25 MG tablet  Dispense: 45 tablet; Refill: 3    Gasping for breath  These episodes are now quite rare  - hydrOXYzine HCl (ATARAX) 25 MG tablet  Dispense: 45 tablet; Refill: 3    Adjustment disorder with anxious mood  This, I believe, is her underlying problem, however, she does not want a daily med to take. She feels her regular exercise is adequate      Review of external notes as documented elsewhere in note  Prescription drug management  33 minutes spent by me on the date of the encounter doing chart review, history and exam, documentation and further activities per the note                  Subjective   Dena is a 24 year old, presenting for the following health issues:  Recheck Medication, Depression, and Anxiety      4/16/2024     8:46 AM   Additional Questions   Roomed by GINA MACKEY MA   Accompanied by SELF     Anxiety    History of Present Illness       Mental Health Follow-up:  Patient presents to follow-up on Depression & Anxiety.Patient's depression since last visit has been:  No change  The patient is not having other symptoms associated with depression.  Patient's anxiety since last visit has been:  No change  The patient is not having other symptoms associated with anxiety.  Any significant life events: No  Patient is feeling anxious or having panic attacks.  Patient has no concerns about alcohol or drug use.    She eats 2-3 servings of fruits and vegetables daily.She consumes 0 sweetened beverage(s) daily.She exercises with enough effort to increase her heart rate 30 to 60 minutes per day.  She exercises with enough effort to increase her heart rate 5 days per week.   She is taking medications regularly.     Feels better -having lost weight. Lower anxiety. Does not feel as bloated, icky.  Wants to keep losing weight - goal of 130.  Runs - changes by  "the day - 2-3 miles per day; most done 3.25, has days she walks.    Struggled with thinking she looks the same    Lives with dad. Job- catering yet.    Med - has hydroxyzine - takes it about 2 times per month. It helps.     Relationships - seeing someone but not sexually active.  Periods are irregular - yesterday, previous to that had it 3/1.    Thinks she got Covid in Feb while being on a trip.        Objective    /74   Pulse 63   Temp 98.3  F (36.8  C) (Oral)   Resp 14   Ht 1.705 m (5' 7.13\")   Wt 66.7 kg (147 lb 0.6 oz)   LMP 04/15/2024 (Exact Date)   SpO2 100%   BMI 22.94 kg/m    Body mass index is 22.94 kg/m .  Physical Exam   GENERAL: alert and no distress  NECK: no adenopathy, no asymmetry, masses, or scars  RESP: lungs clear to auscultation - no rales, rhonchi or wheezes  CV: regular rate and rhythm, normal S1 S2, no S3 or S4, no murmur, click or rub, no peripheral edema  SKIN: no suspicious lesions or rashes  PSYCH: mentation appears normal, affect flat, judgement and insight intact, judgement and insight impaired, and appearance well groomed    No results found for this or any previous visit (from the past 24 hour(s)).        Signed Electronically by: Kamryn Gabriel MD    "

## 2024-05-09 ENCOUNTER — OFFICE VISIT (OUTPATIENT)
Dept: OTOLARYNGOLOGY | Facility: CLINIC | Age: 24
End: 2024-05-09
Payer: COMMERCIAL

## 2024-05-09 DIAGNOSIS — H72.92 PERFORATION OF TYMPANIC MEMBRANE, LEFT: Primary | ICD-10-CM

## 2024-05-09 PROCEDURE — 69610 TYMPANIC MEMBRANE REPAIR: CPT | Mod: LT | Performed by: OTOLARYNGOLOGY

## 2024-05-09 NOTE — PROGRESS NOTES
FOLLOW UP VISIT NOTE      HISTORY OF PRESENT ILLNESS    Dena was seen in follow up after previous 11/20/2023 visit for recheck LEFT ear.   She had a Christina tube placed 2 years ago for serous effusion (April of 2022).  No ear pain or discharge.     Previous ENT visit:    Encounter Diagnoses   Name Primary?    Arthralgia of left temporomandibular joint Yes    Patent pressure equalization (PE) tube, left                 RECOMMENDATIONS:     Return visit 6 months        REVIEW OF SYSTEMS    Review of Systems: a 10-system review is reviewed at this encounter.  See scanned document.       Allergies   Allergen Reactions    No Known Drug Allergy Unknown           PHYSICAL EXAM:        HEAD: Normal appearance and symmetry:  No cutaneous lesions.      EARS:        RIGHT: TM intact nl   LEFT:    Christina tube in position with crusting at base (removed with right angled pick)      Steri patch placed without incident after freshening edges of TM;  Bacitracin       ointment applied.   NOSE:    Dorsum:   straight       ORAL CAVITY/OROPHARYNX:    Lips:  Normal.     NECK:  Trachea:  midline     NEURO:   Alert and Oriented    GAIT AND STATION:  normal     RESPIRATORY:   Symmetry and Respiratory effort    PSYCH:   normal mood and affect    SKIN:  warm and dry         IMPRESSION:   Encounter Diagnosis   Name Primary?    Perforation of tympanic membrane, left Yes            RECOMMENDATIONS:    Orders Placed This Encounter   Procedures    MYRINGOPLASTY      No nose blowing for 2 weeks  Sneeze with mouth open  Return visit 6-8 weeks.

## 2024-05-09 NOTE — LETTER
5/9/2024         RE: Dena Dominguez  [address] [facility] [address] [address]        Dear Colleague,    Thank you for referring your patient, Dena Dominguez, to the [facility]. Please see a copy of my visit note below.    FOLLOW UP VISIT NOTE      HISTORY OF PRESENT ILLNESS    Dena was seen in follow up after previous 11/20/2023 visit for recheck LEFT ear.   She had a Christina tube placed 2 years ago for serous effusion (April of 2022).  No ear pain or discharge.     Previous ENT visit:    Encounter Diagnoses   Name Primary?     Arthralgia of left temporomandibular joint Yes     Patent pressure equalization (PE) tube, left                 RECOMMENDATIONS:     Return visit 6 months        REVIEW OF SYSTEMS    Review of Systems: a 10-system review is reviewed at this encounter.  See scanned document.       Allergies   Allergen Reactions     No Known Drug Allergy Unknown           PHYSICAL EXAM:        HEAD: Normal appearance and symmetry:  No cutaneous lesions.      EARS:        RIGHT: TM intact nl   LEFT:    Christina tube in position with crusting at base (removed with right angled pick)      Steri patch placed without incident after freshening edges of TM;  Bacitracin       ointment applied.   NOSE:    Dorsum:   straight       ORAL CAVITY/OROPHARYNX:    Lips:  Normal.     NECK:  Trachea:  midline     NEURO:   Alert and Oriented    GAIT AND STATION:  normal     RESPIRATORY:   Symmetry and Respiratory effort    PSYCH:   normal mood and affect    SKIN:  warm and dry         IMPRESSION:   Encounter Diagnosis   Name Primary?     Perforation of tympanic membrane, left Yes            RECOMMENDATIONS:    Orders Placed This Encounter   Procedures     MYRINGOPLASTY      No nose blowing for 2 weeks  Sneeze with mouth open  Return visit 6-8 weeks.       Again, thank you for allowing me to participate in the care of your patient.        Sincerely,        Chidi Canela MD

## 2024-06-17 ENCOUNTER — OFFICE VISIT (OUTPATIENT)
Dept: OTOLARYNGOLOGY | Facility: CLINIC | Age: 24
End: 2024-06-17
Payer: COMMERCIAL

## 2024-06-17 DIAGNOSIS — H72.92 PERFORATION OF TYMPANIC MEMBRANE, LEFT: Primary | ICD-10-CM

## 2024-06-17 PROCEDURE — 99213 OFFICE O/P EST LOW 20 MIN: CPT | Performed by: OTOLARYNGOLOGY

## 2024-06-17 NOTE — PATIENT INSTRUCTIONS
You were seen in the ENT Clinic today by Dr. Canela. If you have any questions or concerns after your appointment, please contact us (see below)  2.   Please return to clinic in about 6 months with an audiogram        How to Contact Us:  Send a Pull message to your provider. Our team will respond to you via Pull. Occasionally, we will need to call you to get further information.  For urgent matters (Monday-Friday), call the ENT Clinic: [phone] and speak with a call center team member - they will route your call appropriately.   If you'd like to speak directly with a nurse, please find our contact information below. We do our best to check voicemail frequently throughout the day, and will work to call you back within 1-2 days. For urgent matters, please use the general clinic phone numbers listed above.      Catina Kim RN  ENT RN   [phone]

## 2024-06-17 NOTE — LETTER
6/17/2024      Dena Dominguez  [address] [address]      Dear Colleague,    Thank you for referring your patient, Dena Dominguez, to the [facility]. Please see a copy of my visit note below.    FOLLOW UP VISIT NOTE      HISTORY OF PRESENT ILLNESS    Dena was seen in follow up after previous 5/9/2024 visit for recheck ear.   Last visit I removed a Christina tube and applied a steri patch.  Today she has no ear concerns.  No pain, drainage, or hearing concerns.         REVIEW OF SYSTEMS    Review of Systems: a 10-system review is reviewed at this encounter.  See scanned document.       Allergies   Allergen Reactions     No Known Drug Allergy Unknown           PHYSICAL EXAM:        HEAD: Normal appearance and symmetry:  No cutaneous lesions.      EAR MICROSCOPY:     LEFT:    TM intact with scar formation posteriorsuperioly.  No evidence of NATASHA.        Steri patch removed with right angled hook.   NOSE:    Dorsum:   straight       ORAL CAVITY/OROPHARYNX:    Lips:  Normal.     NECK:  Trachea:  midline     NEURO:   Alert and Oriented    GAIT AND STATION:  normal     RESPIRATORY:   Symmetry and Respiratory effort    PSYCH:   normal mood and affect    SKIN:  warm and dry       IMPRESSION:   Encounter Diagnosis   Name Primary?     Perforation of tympanic membrane, left Yes            RECOMMENDATIONS:    Orders Placed This Encounter   Procedures     BINOCULAR MICROSCOPY      She is okay to come off water precautions.  Return visit 6 months with audiogram        Again, thank you for allowing me to participate in the care of your patient.        Sincerely,        Chidi Canela MD

## 2024-06-17 NOTE — PROGRESS NOTES
FOLLOW UP VISIT NOTE      HISTORY OF PRESENT ILLNESS    Dena was seen in follow up after previous 5/9/2024 visit for recheck ear.   Last visit I removed a Christina tube and applied a steri patch.  Today she has no ear concerns.  No pain, drainage, or hearing concerns.         REVIEW OF SYSTEMS    Review of Systems: a 10-system review is reviewed at this encounter.  See scanned document.       Allergies   Allergen Reactions    No Known Drug Allergy Unknown           PHYSICAL EXAM:        HEAD: Normal appearance and symmetry:  No cutaneous lesions.      EAR MICROSCOPY:     LEFT:    TM intact with scar formation posteriorsuperioly.  No evidence of NATASHA.        Steri patch removed with right angled hook.   NOSE:    Dorsum:   straight       ORAL CAVITY/OROPHARYNX:    Lips:  Normal.     NECK:  Trachea:  midline     NEURO:   Alert and Oriented    GAIT AND STATION:  normal     RESPIRATORY:   Symmetry and Respiratory effort    PSYCH:   normal mood and affect    SKIN:  warm and dry       IMPRESSION:   Encounter Diagnosis   Name Primary?    Perforation of tympanic membrane, left Yes            RECOMMENDATIONS:    Orders Placed This Encounter   Procedures    BINOCULAR MICROSCOPY      She is okay to come off water precautions.  Return visit 6 months with audiogram

## 2024-07-14 ENCOUNTER — HEALTH MAINTENANCE LETTER (OUTPATIENT)
Age: 24
End: 2024-07-14

## 2024-09-17 DIAGNOSIS — Z01.818 PREOP GENERAL PHYSICAL EXAM: Primary | ICD-10-CM

## 2024-09-17 DIAGNOSIS — Z00.00 PREVENTATIVE HEALTH CARE: ICD-10-CM

## 2024-09-24 ENCOUNTER — OFFICE VISIT (OUTPATIENT)
Dept: FAMILY MEDICINE | Facility: CLINIC | Age: 24
End: 2024-09-24
Payer: COMMERCIAL

## 2024-09-24 VITALS
OXYGEN SATURATION: 99 % | HEIGHT: 67 IN | DIASTOLIC BLOOD PRESSURE: 79 MMHG | TEMPERATURE: 98.1 F | BODY MASS INDEX: 23.23 KG/M2 | WEIGHT: 148 LBS | SYSTOLIC BLOOD PRESSURE: 123 MMHG | HEART RATE: 76 BPM | RESPIRATION RATE: 20 BRPM

## 2024-09-24 DIAGNOSIS — Z11.3 SCREENING FOR STDS (SEXUALLY TRANSMITTED DISEASES): ICD-10-CM

## 2024-09-24 DIAGNOSIS — Z30.013 ENCOUNTER FOR INITIAL PRESCRIPTION OF INJECTABLE CONTRACEPTIVE: ICD-10-CM

## 2024-09-24 DIAGNOSIS — Z00.00 PREVENTATIVE HEALTH CARE: Primary | ICD-10-CM

## 2024-09-24 DIAGNOSIS — F43.22 ADJUSTMENT DISORDER WITH ANXIOUS MOOD: ICD-10-CM

## 2024-09-24 PROBLEM — R51.9 HEADACHE: Status: ACTIVE | Noted: 2024-09-24

## 2024-09-24 LAB
ALBUMIN SERPL BCG-MCNC: 4.5 G/DL (ref 3.5–5.2)
ALBUMIN UR-MCNC: NEGATIVE MG/DL
ALP SERPL-CCNC: 59 U/L (ref 40–150)
ALT SERPL W P-5'-P-CCNC: 11 U/L (ref 0–50)
ANION GAP SERPL CALCULATED.3IONS-SCNC: 9 MMOL/L (ref 7–15)
APPEARANCE UR: CLEAR
AST SERPL W P-5'-P-CCNC: 16 U/L (ref 0–45)
BASOPHILS # BLD AUTO: 0 10E3/UL (ref 0–0.2)
BASOPHILS NFR BLD AUTO: 0 %
BILIRUB SERPL-MCNC: 0.4 MG/DL
BILIRUB UR QL STRIP: NEGATIVE
BUN SERPL-MCNC: 10.2 MG/DL (ref 6–20)
CALCIUM SERPL-MCNC: 9.3 MG/DL (ref 8.8–10.4)
CHLORIDE SERPL-SCNC: 104 MMOL/L (ref 98–107)
CHOLEST SERPL-MCNC: 212 MG/DL
COLOR UR AUTO: YELLOW
CREAT SERPL-MCNC: 0.75 MG/DL (ref 0.51–0.95)
EGFRCR SERPLBLD CKD-EPI 2021: >90 ML/MIN/1.73M2
EOSINOPHIL # BLD AUTO: 0 10E3/UL (ref 0–0.7)
EOSINOPHIL NFR BLD AUTO: 0 %
ERYTHROCYTE [DISTWIDTH] IN BLOOD BY AUTOMATED COUNT: 12.3 % (ref 10–15)
FASTING STATUS PATIENT QL REPORTED: YES
FASTING STATUS PATIENT QL REPORTED: YES
GLUCOSE SERPL-MCNC: 83 MG/DL (ref 70–99)
GLUCOSE UR STRIP-MCNC: NEGATIVE MG/DL
HCG UR QL: NEGATIVE
HCO3 SERPL-SCNC: 26 MMOL/L (ref 22–29)
HCT VFR BLD AUTO: 39.9 % (ref 35–47)
HDLC SERPL-MCNC: 51 MG/DL
HGB BLD-MCNC: 13.6 G/DL (ref 11.7–15.7)
HGB UR QL STRIP: NEGATIVE
IMM GRANULOCYTES # BLD: 0 10E3/UL
IMM GRANULOCYTES NFR BLD: 0 %
KETONES UR STRIP-MCNC: NEGATIVE MG/DL
LDLC SERPL CALC-MCNC: 139 MG/DL
LEUKOCYTE ESTERASE UR QL STRIP: NEGATIVE
LYMPHOCYTES # BLD AUTO: 2.4 10E3/UL (ref 0.8–5.3)
LYMPHOCYTES NFR BLD AUTO: 27 %
MCH RBC QN AUTO: 29.4 PG (ref 26.5–33)
MCHC RBC AUTO-ENTMCNC: 34.1 G/DL (ref 31.5–36.5)
MCV RBC AUTO: 86 FL (ref 78–100)
MONOCYTES # BLD AUTO: 0.7 10E3/UL (ref 0–1.3)
MONOCYTES NFR BLD AUTO: 8 %
NEUTROPHILS # BLD AUTO: 5.6 10E3/UL (ref 1.6–8.3)
NEUTROPHILS NFR BLD AUTO: 64 %
NITRATE UR QL: NEGATIVE
NONHDLC SERPL-MCNC: 161 MG/DL
PH UR STRIP: 5.5 [PH] (ref 5–7)
PLATELET # BLD AUTO: 247 10E3/UL (ref 150–450)
POTASSIUM SERPL-SCNC: 4.2 MMOL/L (ref 3.4–5.3)
PROT SERPL-MCNC: 7.5 G/DL (ref 6.4–8.3)
RBC # BLD AUTO: 4.63 10E6/UL (ref 3.8–5.2)
SODIUM SERPL-SCNC: 139 MMOL/L (ref 135–145)
SP GR UR STRIP: 1.01 (ref 1–1.03)
TRIGL SERPL-MCNC: 111 MG/DL
UROBILINOGEN UR STRIP-ACNC: 0.2 E.U./DL
WBC # BLD AUTO: 8.8 10E3/UL (ref 4–11)

## 2024-09-24 PROCEDURE — 81025 URINE PREGNANCY TEST: CPT | Performed by: FAMILY MEDICINE

## 2024-09-24 PROCEDURE — 96372 THER/PROPH/DIAG INJ SC/IM: CPT | Performed by: FAMILY MEDICINE

## 2024-09-24 PROCEDURE — 80061 LIPID PANEL: CPT | Performed by: FAMILY MEDICINE

## 2024-09-24 PROCEDURE — 81003 URINALYSIS AUTO W/O SCOPE: CPT | Performed by: FAMILY MEDICINE

## 2024-09-24 PROCEDURE — 85025 COMPLETE CBC W/AUTO DIFF WBC: CPT | Performed by: FAMILY MEDICINE

## 2024-09-24 PROCEDURE — 36415 COLL VENOUS BLD VENIPUNCTURE: CPT | Performed by: FAMILY MEDICINE

## 2024-09-24 PROCEDURE — 99395 PREV VISIT EST AGE 18-39: CPT | Mod: 25 | Performed by: FAMILY MEDICINE

## 2024-09-24 PROCEDURE — 80053 COMPREHEN METABOLIC PANEL: CPT | Performed by: FAMILY MEDICINE

## 2024-09-24 PROCEDURE — 99214 OFFICE O/P EST MOD 30 MIN: CPT | Mod: 25 | Performed by: FAMILY MEDICINE

## 2024-09-24 PROCEDURE — 87491 CHLMYD TRACH DNA AMP PROBE: CPT | Performed by: FAMILY MEDICINE

## 2024-09-24 RX ORDER — HYDROXYZINE HYDROCHLORIDE 25 MG/1
25 TABLET, FILM COATED ORAL EVERY 8 HOURS PRN
Qty: 45 TABLET | Refills: 3 | Status: SHIPPED | OUTPATIENT
Start: 2024-09-24

## 2024-09-24 RX ORDER — MEDROXYPROGESTERONE ACETATE 150 MG/ML
150 INJECTION, SUSPENSION INTRAMUSCULAR
Status: ACTIVE | OUTPATIENT
Start: 2024-09-24 | End: 2025-09-19

## 2024-09-24 RX ADMIN — MEDROXYPROGESTERONE ACETATE 150 MG: 150 INJECTION, SUSPENSION INTRAMUSCULAR at 08:54

## 2024-09-24 ASSESSMENT — PATIENT HEALTH QUESTIONNAIRE - PHQ9: SUM OF ALL RESPONSES TO PHQ QUESTIONS 1-9: 10

## 2024-09-24 NOTE — PROGRESS NOTES
Preventive Care Visit  [facility] CLEOPATRA Gabriel MD, Family Medicine  Sep 24, 2024      Assessment & Plan     Preventative health care  Completed today. While she has constant anxiety, she is managing things well overall.   - CBC with platelets and differential  - Comprehensive metabolic panel (BMP + Alb, Alk Phos, ALT, AST, Total. Bili, TP)  - Lipid panel reflex to direct LDL Non-fasting  - UA Macroscopic with reflex to Microscopic and Culture - Lab Collect  - medroxyPROGESTERone (DEPO-PROVERA) injection 150 mg  - Chlamydia trachomatis PCR - Clinic Collect    Screening for STDs (sexually transmitted diseases)  Ordered - await results    Encounter for initial prescription of injectable contraceptive  Out of all the options for contraception, she chose to try depo starting today  - HCG qualitative urine  - HCG qualitative urine  - medroxyPROGESTERone (DEPO-PROVERA) injection 150 mg    Adjustment disorder with anxious mood  This problem continues. She gets regular therapy but lives daily with anxiety. I encouraged her to keep up exercise and talking to sisters, but if she wants to try a medication, I'd be willing to start her on excitalopram and follow up - she can write in by Eons if/when she wants to start it.            Counseling  Appropriate preventive services were addressed with this patient via screening, questionnaire, or discussion as appropriate for fall prevention, nutrition, physical activity, Tobacco-use cessation, social engagement, weight loss and cognition.  Checklist reviewing preventive services available has been given to the patient.  Reviewed patient's diet, addressing concerns and/or questions.   The patient's PHQ-9 score is consistent with moderate depression. She was provided with information regarding depression.                     Richard Fernandes is a 24 year old, presenting for the following:  Physical        9/24/2024     7:57 AM   Additional Questions    Roomed by paw p   Accompanied by self        Health Care Directive  Patient does not have a Health Care Directive or Living Will: Advance Directive received and scanned. Click on Code in the patient header to view.    HPI  Contraception - not sure if the pill is for her.   ?try depo    Has a new partner  Belongs to the Y    Same work    Lots of anxiety about the relationship, but coping with walking, talking, etc    Sleeping too much?         9/24/2024   General Health   How would you rate your overall physical health? (!) FAIR   Feel stress (tense, anxious, or unable to sleep) Rather much      (!) STRESS CONCERN      9/24/2024   Nutrition   Three or more servings of calcium each day? (!) NO   Diet: Regular (no restrictions)   How many servings of fruit and vegetables per day? (!) 2-3   How many sweetened beverages each day? 0-1            9/24/2024   Exercise   Days per week of moderate/strenous exercise 4 days   Average minutes spent exercising at this level 30 min            9/24/2024   Social Factors   Frequency of gathering with friends or relatives Twice a week   Worry food won't last until get money to buy more No   Food not last or not have enough money for food? No   Do you have housing? (Housing is defined as stable permanent housing and does not include staying ouside in a car, in a tent, in an abandoned building, in an overnight shelter, or couch-surfing.) Yes   Are you worried about losing your housing? No   Lack of transportation? No   Unable to get utilities (heat,electricity)? No            9/24/2024   Dental   Dentist two times every year? Yes            9/24/2024   TB Screening   Were you born outside of the [place]? No                    9/24/2024   Substance Use   Alcohol more than 3/day or more than 7/wk No   Do you use any other substances recreationally? No        Social History     Tobacco Use    Smoking status: Never     Passive exposure: Past    Smokeless tobacco: Never   Vaping Use    Vaping  "status: Never Used   Substance Use Topics    Alcohol use: No    Drug use: No           2024   STI Screening   New sexual partner(s) since last STI/HIV test? (!) YES         History of abnormal Pap smear: No - age 21-29 PAP every 3 years recommended        2023     2:33 PM   PAP / HPV   PAP Negative for Intraepithelial Lesion or Malignancy (NILM)            2024   Contraception/Family Planning   Questions about contraception or family planning (!) YES           Reviewed and updated as needed this visit by Provider   Tobacco   Meds   Med Hx  Surg Hx  Fam Hx  Soc Hx Sexual Activity          Past Medical History:   Diagnosis Date    Adjustment disorder 2019    Migraines     Overweight 2019    Panic 2019     Past Surgical History:   Procedure Laterality Date    TONSILLECTOMY  2016     OB History    Para Term  AB Living   0 0 0 0 0 0   SAB IAB Ectopic Multiple Live Births   0 0 0 0 0     Lab work is in process  Labs reviewed in EPIC       Objective    Exam  /79   Pulse 76   Temp 98.1  F (36.7  C) (Oral)   Resp 20   Ht 1.705 m (5' 7.13\")   Wt 67.1 kg (148 lb)   LMP 2024 (Exact Date)   SpO2 99%   BMI 23.09 kg/m     Estimated body mass index is 23.09 kg/m  as calculated from the following:    Height as of this encounter: 1.705 m (5' 7.13\").    Weight as of this encounter: 67.1 kg (148 lb).    Physical Exam  GENERAL: alert and no distress  EYES: Eyes grossly normal to inspection, PERRL and conjunctivae and sclerae normal  HENT: ear canals and TM's normal, nose and mouth without ulcers or lesions  NECK: no adenopathy, no asymmetry, masses, or scars  RESP: lungs clear to auscultation - no rales, rhonchi or wheezes  CV: regular rate and rhythm, normal S1 S2, no S3 or S4, no murmur, click or rub, no peripheral edema  ABDOMEN: soft, nontender, no hepatosplenomegaly, no masses and bowel sounds normal  MS: no gross musculoskeletal defects noted, no edema  SKIN: " no suspicious lesions or rashes  PSYCH: mentation appears normal, affect flat, anxious, fatigued, judgement and insight intact, and appearance well groomed        Signed Electronically by: Kamryn Gabriel MD

## 2024-09-25 ENCOUNTER — MYC MEDICAL ADVICE (OUTPATIENT)
Dept: FAMILY MEDICINE | Facility: CLINIC | Age: 24
End: 2024-09-25
Payer: COMMERCIAL

## 2024-09-25 DIAGNOSIS — L20.81 ATOPIC NEURODERMATITIS: Primary | ICD-10-CM

## 2024-09-25 DIAGNOSIS — L20.89 OTHER ATOPIC DERMATITIS: ICD-10-CM

## 2024-09-25 LAB — C TRACH DNA SPEC QL NAA+PROBE: NEGATIVE

## 2024-09-26 DIAGNOSIS — E78.2 MIXED HYPERLIPIDEMIA: Primary | ICD-10-CM

## 2024-09-26 RX ORDER — CLOBETASOL PROPIONATE 0.5 MG/G
OINTMENT TOPICAL 2 TIMES DAILY
Qty: 30 G | Refills: 1 | Status: SHIPPED | OUTPATIENT
Start: 2024-09-26

## 2024-10-17 ENCOUNTER — NURSE TRIAGE (OUTPATIENT)
Dept: FAMILY MEDICINE | Facility: CLINIC | Age: 24
End: 2024-10-17
Payer: COMMERCIAL

## 2024-10-17 NOTE — TELEPHONE ENCOUNTER
Covering provider:     Patient should try taking naproxen 440 mg (2 of the 220 mg over-the-counter pills) once every 12 hours for the next 7 days to see if that helps reduce the menstruation bleeding.    As for the heightened depression and anxiety symptoms, please help schedule patient to be seen for further evaluation.      Raudel Morales MD  [facility] [place] [address]  Phone: [phone]  Fax: [phone]    10/17/2024  3:14 PM

## 2024-10-17 NOTE — TELEPHONE ENCOUNTER
Called patient back and relayed Dr. Morales's recommendation. Patient states will go purchase the naproxen and start on it for the 7 days. Assisted in scheduling patient for an appt to follow-up with Dr. Gabriel on 10/29. Instructed patient to call us sooner if any symptoms changes or worsen, patient verbalizes agreement with plan.       Charlie Maxwell, MSN, RN   [facility]

## 2024-10-17 NOTE — TELEPHONE ENCOUNTER
"Nurse Triage SBAR    Is this a 2nd Level Triage? YES, LICENSED PRACTITIONER REVIEW IS REQUIRED    Situation:  Patient experiencing: Headache, Depression, Anxiety, and  Back pain since receiving her Depo shot on 9/24/2024.    Background:   Patient was never on any Birth control options prior to this.    Assessment:  Per patient, she received her first Depo shot on 9/24/2024 during Physical with Dr. Gabriel. So far, she has been bleeding for 9 days. Patient understand she will experience abnormal spotting/bleeding for about 3 months.   The most bothersome symptom is: depression & anxiety for patient  Patient has NO suicidal idea, no plan to harm self  Vaginal bleeding is mild, Changing pad every couple hours  Some days is lighter/spotting, some days is heavier; keeps going back and forth  Patient says, \" It feels like a never ending period.\"  No abdominal pain or pelvic pain  No chance of pregnancy  Patient feeling fatigue, low energy, not able to do much around the house  Patient does have some low appetite  Patient took yesterday off work due to anxiety; not able to concentrate at work  No dizziness, no chest pain, no difficulty breathing  Injection site looks good, no redness.  All symptoms seem to be \"heighten\" after Depo shot.    Protocol Recommended Disposition:   Discuss with PCP and Callback by Nurse Today    Recommendation:  Care Advice given to patient.  Informed patient Dr. Gabriel is NOT in clinic today, but message will be sent to covering provider/doctor to review and advise on patient question. Phone number verified with patient: [phone]  OK to leave detailed VM    Routed to provider    Does the patient meet one of the following criteria for ADS visit consideration? No    TAVON SaavedraN, RN, PHN   [facility]    Reason for Disposition   Caller has NON-URGENT question and triager unable to answer question    Additional Information   Negative: Abdominal pain and pregnant < 20 " weeks   Negative: Vaginal bleeding and pregnant < 20 weeks   Negative: Vaginal discharge is main symptom   Negative: SEVERE abdominal pain (e.g., excruciating) and present > 1 hour   Negative: SEVERE vaginal bleeding (e.g., soaking 2 pads or tampons per hour and present 2 or more hours; 1 menstrual cup every 2 hours)   Negative: SEVERE dizziness (e.g., unable to stand, requires support to walk, feels like passing out now)   Negative: Patient sounds very sick or weak to the triager   Negative: MODERATE vaginal bleeding (e.g., soaking 1 pad or tampon per hour and present > 6 hours; 1 menstrual cup every 6 hours)   Negative: Vaginal bleeding is WORSE than normal (e.g., heavier) and dizziness or lightheadedness   Negative: Constant abdominal pain and present > 2 hours   Negative: Injection site looks infected (spreading redness, red streak, or pus)   Negative: Redness or red streak around the injection site and begins > 48 hours after shot    Protocols used: Contraception - Birth Control Shot Symptoms and Vmuitvrao-M-EX

## 2024-10-29 ENCOUNTER — OFFICE VISIT (OUTPATIENT)
Dept: FAMILY MEDICINE | Facility: CLINIC | Age: 24
End: 2024-10-29
Payer: COMMERCIAL

## 2024-10-29 VITALS
WEIGHT: 148.75 LBS | HEIGHT: 67 IN | TEMPERATURE: 98.4 F | RESPIRATION RATE: 12 BRPM | HEART RATE: 86 BPM | SYSTOLIC BLOOD PRESSURE: 115 MMHG | OXYGEN SATURATION: 99 % | DIASTOLIC BLOOD PRESSURE: 79 MMHG | BODY MASS INDEX: 23.35 KG/M2

## 2024-10-29 DIAGNOSIS — Z23 NEED FOR VACCINATION: ICD-10-CM

## 2024-10-29 DIAGNOSIS — F43.22 ADJUSTMENT DISORDER WITH ANXIOUS MOOD: Primary | ICD-10-CM

## 2024-10-29 DIAGNOSIS — Z00.00 PREVENTATIVE HEALTH CARE: ICD-10-CM

## 2024-10-29 PROCEDURE — 99214 OFFICE O/P EST MOD 30 MIN: CPT | Performed by: FAMILY MEDICINE

## 2024-10-29 PROCEDURE — G2211 COMPLEX E/M VISIT ADD ON: HCPCS | Performed by: FAMILY MEDICINE

## 2024-10-29 ASSESSMENT — ANXIETY QUESTIONNAIRES
IF YOU CHECKED OFF ANY PROBLEMS ON THIS QUESTIONNAIRE, HOW DIFFICULT HAVE THESE PROBLEMS MADE IT FOR YOU TO DO YOUR WORK, TAKE CARE OF THINGS AT HOME, OR GET ALONG WITH OTHER PEOPLE: SOMEWHAT DIFFICULT
GAD7 TOTAL SCORE: 16
3. WORRYING TOO MUCH ABOUT DIFFERENT THINGS: NEARLY EVERY DAY
7. FEELING AFRAID AS IF SOMETHING AWFUL MIGHT HAPPEN: SEVERAL DAYS
GAD7 TOTAL SCORE: 16
1. FEELING NERVOUS, ANXIOUS, OR ON EDGE: NEARLY EVERY DAY
6. BECOMING EASILY ANNOYED OR IRRITABLE: MORE THAN HALF THE DAYS
8. IF YOU CHECKED OFF ANY PROBLEMS, HOW DIFFICULT HAVE THESE MADE IT FOR YOU TO DO YOUR WORK, TAKE CARE OF THINGS AT HOME, OR GET ALONG WITH OTHER PEOPLE?: SOMEWHAT DIFFICULT
4. TROUBLE RELAXING: NEARLY EVERY DAY
2. NOT BEING ABLE TO STOP OR CONTROL WORRYING: NEARLY EVERY DAY
GAD7 TOTAL SCORE: 16
7. FEELING AFRAID AS IF SOMETHING AWFUL MIGHT HAPPEN: SEVERAL DAYS
5. BEING SO RESTLESS THAT IT IS HARD TO SIT STILL: SEVERAL DAYS

## 2024-10-29 ASSESSMENT — PATIENT HEALTH QUESTIONNAIRE - PHQ9: SUM OF ALL RESPONSES TO PHQ QUESTIONS 1-9: 14

## 2024-10-29 NOTE — PROGRESS NOTES
"  Assessment & Plan     Adjustment disorder with anxious mood  She broke up with her \"boyfriend\" as he did not seem to want to get together and do things. She realized she wanted someone who wanted to be together. She knows she did the right thing, but she's sad.  I told her if her grieving goes beyond 2 months, we should consider an antidepressant.    Need for vaccination  given  - INFLUENZA VACCINE,SPLIT VIRUS,TRIVALENT,PF(FLUZONE)    Preventative health care  reviewed  - REVIEW OF HEALTH MAINTENANCE PROTOCOL ORDERS    On the day of service, I spent 30 minutes with the patient, preparing for the visit with chart review, and charting.                      Subjective   Dena is a 24 year old, presenting for the following health issues:  Follow Up ( Anxiety and depression got worse after starting birth control )      10/29/2024    12:25 PM   Additional Questions   Roomed by ROLANDO Katz   Accompanied by self     History of Present Illness       Mental Health Follow-up:  Patient presents to follow-up on Depression & Anxiety.Patient's depression since last visit has been:  Worse  The patient is not having other symptoms associated with depression.  Patient's anxiety since last visit has been:  Bad  The patient is not having other symptoms associated with anxiety.  Any significant life events: relationship concerns  Patient is feeling anxious or having panic attacks.  Patient has no concerns about alcohol or drug use.    She eats 2-3 servings of fruits and vegetables daily.She consumes 0 sweetened beverage(s) daily.She exercises with enough effort to increase her heart rate 20 to 29 minutes per day.  She exercises with enough effort to increase her heart rate 4 days per week.   She is taking medications regularly.     After depo - bled hard for several days  Taking naproxen helped, now she is no longer bleeding x 3 days    Future contraception - currently not needed    Broke up with boyfriend - she initiated it " "because he was not giving her what she wanted. She could not get him to spend time with her. He was not treating her the same. She is not sure why. They rushed in, but that's because he said he wanted to be with her.  He lives an hour away and she always was the one to drive to him.  It was hard to make plans.    Not doing any stress relief -   Some journalling  Not much exercise    Hydroxyzine - took 2-3 in the past week.       Objective    /79 (BP Location: Left arm, Patient Position: Sitting, Cuff Size: Adult Regular)   Pulse 86   Temp 98.4  F (36.9  C) (Oral)   Resp 12   Ht 1.705 m (5' 7.13\")   Wt 67.5 kg (148 lb 12 oz)   LMP 10/01/2024 (Within Days)   SpO2 99%   BMI 23.21 kg/m    Body mass index is 23.21 kg/m .  Physical Exam   GENERAL: alert and no distress  RESP: lungs clear to auscultation - no rales, rhonchi or wheezes  CV: regular rate and rhythm, normal S1 S2, no S3 or S4, no murmur, click or rub, no peripheral edema  SKIN: no suspicious lesions or rashes  PSYCH: mentation appears normal, affect flat, and tearful            Signed Electronically by: Kamryn Gabriel MD    "

## 2024-11-20 ENCOUNTER — MYC REFILL (OUTPATIENT)
Dept: FAMILY MEDICINE | Facility: CLINIC | Age: 24
End: 2024-11-20
Payer: COMMERCIAL

## 2024-11-21 RX ORDER — HYDROXYZINE HYDROCHLORIDE 25 MG/1
25 TABLET, FILM COATED ORAL EVERY 8 HOURS PRN
Qty: 45 TABLET | Refills: 3 | OUTPATIENT
Start: 2024-11-21

## 2025-04-15 ENCOUNTER — OFFICE VISIT (OUTPATIENT)
Dept: FAMILY MEDICINE | Facility: CLINIC | Age: 25
End: 2025-04-15
Payer: COMMERCIAL

## 2025-04-15 VITALS
DIASTOLIC BLOOD PRESSURE: 71 MMHG | SYSTOLIC BLOOD PRESSURE: 110 MMHG | HEIGHT: 67 IN | RESPIRATION RATE: 12 BRPM | OXYGEN SATURATION: 100 % | TEMPERATURE: 99.1 F | BODY MASS INDEX: 22.1 KG/M2 | WEIGHT: 140.8 LBS | HEART RATE: 80 BPM

## 2025-04-15 DIAGNOSIS — E78.2 MIXED HYPERLIPIDEMIA: ICD-10-CM

## 2025-04-15 DIAGNOSIS — E78.5 ELEVATED LIPIDS: ICD-10-CM

## 2025-04-15 DIAGNOSIS — S90.221D: ICD-10-CM

## 2025-04-15 DIAGNOSIS — N92.1 MENORRHAGIA WITH IRREGULAR CYCLE: ICD-10-CM

## 2025-04-15 DIAGNOSIS — F41.1 GENERALIZED ANXIETY DISORDER: Primary | ICD-10-CM

## 2025-04-15 DIAGNOSIS — N92.1 MENORRHAGIA WITH IRREGULAR CYCLE: Primary | ICD-10-CM

## 2025-04-15 LAB
BASOPHILS # BLD AUTO: 0 10E3/UL (ref 0–0.2)
BASOPHILS NFR BLD AUTO: 1 %
EOSINOPHIL # BLD AUTO: 0 10E3/UL (ref 0–0.7)
EOSINOPHIL NFR BLD AUTO: 0 %
ERYTHROCYTE [DISTWIDTH] IN BLOOD BY AUTOMATED COUNT: 12.7 % (ref 10–15)
HCT VFR BLD AUTO: 41.3 % (ref 35–47)
HGB BLD-MCNC: 13.8 G/DL (ref 11.7–15.7)
IMM GRANULOCYTES # BLD: 0 10E3/UL
IMM GRANULOCYTES NFR BLD: 0 %
LYMPHOCYTES # BLD AUTO: 1.9 10E3/UL (ref 0.8–5.3)
LYMPHOCYTES NFR BLD AUTO: 29 %
MCH RBC QN AUTO: 28.5 PG (ref 26.5–33)
MCHC RBC AUTO-ENTMCNC: 33.4 G/DL (ref 31.5–36.5)
MCV RBC AUTO: 85 FL (ref 78–100)
MONOCYTES # BLD AUTO: 0.5 10E3/UL (ref 0–1.3)
MONOCYTES NFR BLD AUTO: 7 %
NEUTROPHILS # BLD AUTO: 4.2 10E3/UL (ref 1.6–8.3)
NEUTROPHILS NFR BLD AUTO: 63 %
PLATELET # BLD AUTO: 242 10E3/UL (ref 150–450)
RBC # BLD AUTO: 4.85 10E6/UL (ref 3.8–5.2)
WBC # BLD AUTO: 6.7 10E3/UL (ref 4–11)

## 2025-04-15 PROCEDURE — 85025 COMPLETE CBC W/AUTO DIFF WBC: CPT | Performed by: FAMILY MEDICINE

## 2025-04-15 PROCEDURE — 3074F SYST BP LT 130 MM HG: CPT | Performed by: FAMILY MEDICINE

## 2025-04-15 PROCEDURE — 99215 OFFICE O/P EST HI 40 MIN: CPT | Performed by: FAMILY MEDICINE

## 2025-04-15 PROCEDURE — 36415 COLL VENOUS BLD VENIPUNCTURE: CPT | Performed by: FAMILY MEDICINE

## 2025-04-15 PROCEDURE — 3078F DIAST BP <80 MM HG: CPT | Performed by: FAMILY MEDICINE

## 2025-04-15 PROCEDURE — 82728 ASSAY OF FERRITIN: CPT | Performed by: FAMILY MEDICINE

## 2025-04-15 PROCEDURE — 96127 BRIEF EMOTIONAL/BEHAV ASSMT: CPT | Performed by: FAMILY MEDICINE

## 2025-04-15 PROCEDURE — 83695 ASSAY OF LIPOPROTEIN(A): CPT | Performed by: FAMILY MEDICINE

## 2025-04-15 ASSESSMENT — ANXIETY QUESTIONNAIRES
7. FEELING AFRAID AS IF SOMETHING AWFUL MIGHT HAPPEN: SEVERAL DAYS
8. IF YOU CHECKED OFF ANY PROBLEMS, HOW DIFFICULT HAVE THESE MADE IT FOR YOU TO DO YOUR WORK, TAKE CARE OF THINGS AT HOME, OR GET ALONG WITH OTHER PEOPLE?: SOMEWHAT DIFFICULT
IF YOU CHECKED OFF ANY PROBLEMS ON THIS QUESTIONNAIRE, HOW DIFFICULT HAVE THESE PROBLEMS MADE IT FOR YOU TO DO YOUR WORK, TAKE CARE OF THINGS AT HOME, OR GET ALONG WITH OTHER PEOPLE: SOMEWHAT DIFFICULT
2. NOT BEING ABLE TO STOP OR CONTROL WORRYING: SEVERAL DAYS
5. BEING SO RESTLESS THAT IT IS HARD TO SIT STILL: SEVERAL DAYS
GAD7 TOTAL SCORE: 7
3. WORRYING TOO MUCH ABOUT DIFFERENT THINGS: SEVERAL DAYS
1. FEELING NERVOUS, ANXIOUS, OR ON EDGE: SEVERAL DAYS
GAD7 TOTAL SCORE: 7
6. BECOMING EASILY ANNOYED OR IRRITABLE: SEVERAL DAYS
4. TROUBLE RELAXING: SEVERAL DAYS
7. FEELING AFRAID AS IF SOMETHING AWFUL MIGHT HAPPEN: SEVERAL DAYS
GAD7 TOTAL SCORE: 7

## 2025-04-15 NOTE — PROGRESS NOTES
Assessment & Plan     Generalized anxiety disorder (with panic)  She is managing this fairly well. Getting exercise, sleep is improved - uses hydroxyzine prn. Not seeing a counselor or taking a med at this time. If she wants to go on a daily med, I'll prescribe it and then see her in follow up. I think she should do this now, but she strongly prefers not to be on regular medication.   She is not in a romantic relationship now and I think that helps her. She states she is being safe while dating.    Mixed hyperlipidemia  Watching/monitoring. Checking lipoprotein a because we were doing labs today.  - Lipoprotein (a)    Menorrhagia with irregular cycle  She is self-monitoring on this and currently it seems ok. It is notable she gets PMS - another thing that an anti-anxiety med might help. Also, if she has heavy or prolonged bleeding again, do pelvic ultrasound and then see her in follow up.  - CBC with platelets and differential  - Ferritin    Elevated lipids  Due to this, checked lipoprotein a today    Toenail bruise, right, subsequent encounter  Healing. She must have bruised the nail and had a bit of eczema at the same time. No more steroid ointment since itching is gone.    On the day of service, I spent 40 minutes with the patient, preparing for the visit with chart review, and charting.                    Richard Fernandes is a 25 year old, presenting for the following health issues:  RECHECK (Anxiety. Period is back to normal per pt)      4/15/2025     1:18 PM   Additional Questions   Roomed by paw p   Accompanied by self     History of Present Illness       Mental Health Follow-up:  Patient presents to follow-up on Depression & Anxiety.Patient's depression since last visit has been:  Medium  The patient is not having other symptoms associated with depression.  Patient's anxiety since last visit has been:  Medium  The patient is not having other symptoms associated with anxiety.  Any significant life  "events: No  Patient is feeling anxious or having panic attacks.  Patient has no concerns about alcohol or drug use.    She eats 2-3 servings of fruits and vegetables daily.She consumes 0 sweetened beverage(s) daily.She exercises with enough effort to increase her heart rate 20 to 29 minutes per day.  She exercises with enough effort to increase her heart rate 4 days per week.   She is taking medications regularly.      Since last visit -  Period was 3-4 days  Feels it coming.    Eating - doing well  Was better a year ago  Yesterday - had no breakfast, class, lunch - 2 waffles with PB, 2 sliced walters, snack - cucumbers with hummus and pepperoni; also strawberries; supper - salad and mini-pizza, ice cream later    Exercise - not as much laterly.   Not as consistent  School adds stress    Weight - down 8 pounds. Lowest was 135.    Mood - anxiety - worse with PMS. Took hydroxyzine to help to get to sleep. Went to the library -       Objective    /71   Pulse 80   Temp 99.1  F (37.3  C) (Oral)   Resp 12   Ht 1.694 m (5' 6.69\")   Wt 63.9 kg (140 lb 12.8 oz)   LMP 03/15/2025 (Approximate)   SpO2 100%   BMI 22.26 kg/m    Body mass index is 22.26 kg/m .  Physical Exam   GENERAL: alert and no distress  RESP: lungs clear to auscultation - no rales, rhonchi or wheezes  CV: regular rate and rhythm, normal S1 S2, no S3 or S4, no murmur, click or rub, no peripheral edema  MS: no gross musculoskeletal defects noted, no edema  SKIN: right great toe toenail - has missing nail medially and laterally, light dull erythema on end of toe; no pus present, no swelling present; under middle of nail is eccymosis  PSYCH: mentation appears normal, affect flat, anxious, judgement and insight intact, and appearance well groomed    Office Visit on 09/24/2024   Component Date Value Ref Range Status    Sodium 09/24/2024 139  135 - 145 mmol/L Final    Potassium 09/24/2024 4.2  3.4 - 5.3 mmol/L Final    Carbon Dioxide (CO2) 09/24/2024 26  " 22 - 29 mmol/L Final    Anion Gap 09/24/2024 9  7 - 15 mmol/L Final    Urea Nitrogen 09/24/2024 10.2  6.0 - 20.0 mg/dL Final    Creatinine 09/24/2024 0.75  0.51 - 0.95 mg/dL Final    GFR Estimate 09/24/2024 >90  >60 mL/min/1.73m2 Final    eGFR calculated using 2021 CKD-EPI equation.    Calcium 09/24/2024 9.3  8.8 - 10.4 mg/dL Final    Reference intervals for this test were updated on 7/16/2024 to reflect our healthy population more accurately. There may be differences in the flagging of prior results with similar values performed with this method. Those prior results can be interpreted in the context of the updated reference intervals.    Chloride 09/24/2024 104  98 - 107 mmol/L Final    Glucose 09/24/2024 83  70 - 99 mg/dL Final    Alkaline Phosphatase 09/24/2024 59  40 - 150 U/L Final    AST 09/24/2024 16  0 - 45 U/L Final    ALT 09/24/2024 11  0 - 50 U/L Final    Protein Total 09/24/2024 7.5  6.4 - 8.3 g/dL Final    Albumin 09/24/2024 4.5  3.5 - 5.2 g/dL Final    Bilirubin Total 09/24/2024 0.4  <=1.2 mg/dL Final    Patient Fasting > 8hrs? 09/24/2024 Yes   Final    Cholesterol 09/24/2024 212 (H)  <200 mg/dL Final    Triglycerides 09/24/2024 111  <150 mg/dL Final    Direct Measure HDL 09/24/2024 51  >=50 mg/dL Final    LDL Cholesterol Calculated 09/24/2024 139 (H)  <100 mg/dL Final    Non HDL Cholesterol 09/24/2024 161 (H)  <130 mg/dL Final    Patient Fasting > 8hrs? 09/24/2024 Yes   Final    Color Urine 09/24/2024 Yellow  Colorless, Straw, Light Yellow, Yellow Final    Appearance Urine 09/24/2024 Clear  Clear Final    Glucose Urine 09/24/2024 Negative  Negative mg/dL Final    Bilirubin Urine 09/24/2024 Negative  Negative Final    Ketones Urine 09/24/2024 Negative  Negative mg/dL Final    Specific Gravity Urine 09/24/2024 1.010  1.005 - 1.030 Final    Blood Urine 09/24/2024 Negative  Negative Final    pH Urine 09/24/2024 5.5  5.0 - 7.0 Final    Protein Albumin Urine 09/24/2024 Negative  Negative mg/dL Final     Urobilinogen Urine 09/24/2024 0.2  0.2, 1.0 E.U./dL Final    Nitrite Urine 09/24/2024 Negative  Negative Final    Leukocyte Esterase Urine 09/24/2024 Negative  Negative Final    WBC Count 09/24/2024 8.8  4.0 - 11.0 10e3/uL Final    RBC Count 09/24/2024 4.63  3.80 - 5.20 10e6/uL Final    Hemoglobin 09/24/2024 13.6  11.7 - 15.7 g/dL Final    Hematocrit 09/24/2024 39.9  35.0 - 47.0 % Final    MCV 09/24/2024 86  78 - 100 fL Final    MCH 09/24/2024 29.4  26.5 - 33.0 pg Final    MCHC 09/24/2024 34.1  31.5 - 36.5 g/dL Final    RDW 09/24/2024 12.3  10.0 - 15.0 % Final    Platelet Count 09/24/2024 247  150 - 450 10e3/uL Final    % Neutrophils 09/24/2024 64  % Final    % Lymphocytes 09/24/2024 27  % Final    % Monocytes 09/24/2024 8  % Final    % Eosinophils 09/24/2024 0  % Final    % Basophils 09/24/2024 0  % Final    % Immature Granulocytes 09/24/2024 0  % Final    Absolute Neutrophils 09/24/2024 5.6  1.6 - 8.3 10e3/uL Final    Absolute Lymphocytes 09/24/2024 2.4  0.8 - 5.3 10e3/uL Final    Absolute Monocytes 09/24/2024 0.7  0.0 - 1.3 10e3/uL Final    Absolute Eosinophils 09/24/2024 0.0  0.0 - 0.7 10e3/uL Final    Absolute Basophils 09/24/2024 0.0  0.0 - 0.2 10e3/uL Final    Absolute Immature Granulocytes 09/24/2024 0.0  <=0.4 10e3/uL Final    hCG Urine Qualitative 09/24/2024 Negative  Negative Final    This test is for screening purposes.  Results should be interpreted along with the clinical picture.  Confirmation testing is available if warranted by ordering [id number], HCG Quantitative Pregnancy.    Chlamydia trachomatis 09/24/2024 Negative  Negative Final    A negative result by transcription mediated amplification does not preclude the presence of C. trachomatis infection because results are dependent on proper and adequate collection, absence of inhibitors and sufficient rRNA to be detected.     Results for orders placed or performed in visit on 04/15/25 (from the past 24 hours)   CBC with platelets and differential     Narrative    The following orders were created for panel order CBC with platelets and differential.  Procedure                               Abnormality         Status                     ---------                               -----------         ------                     CBC with platelets and ...[[id number]]                      Final result                 Please view results for these tests on the individual orders.   CBC with platelets and differential   Result Value Ref Range    WBC Count 6.7 4.0 - 11.0 10e3/uL    RBC Count 4.85 3.80 - 5.20 10e6/uL    Hemoglobin 13.8 11.7 - 15.7 g/dL    Hematocrit 41.3 35.0 - 47.0 %    MCV 85 78 - 100 fL    MCH 28.5 26.5 - 33.0 pg    MCHC 33.4 31.5 - 36.5 g/dL    RDW 12.7 10.0 - 15.0 %    Platelet Count 242 150 - 450 10e3/uL    % Neutrophils 63 %    % Lymphocytes 29 %    % Monocytes 7 %    % Eosinophils 0 %    % Basophils 1 %    % Immature Granulocytes 0 %    Absolute Neutrophils 4.2 1.6 - 8.3 10e3/uL    Absolute Lymphocytes 1.9 0.8 - 5.3 10e3/uL    Absolute Monocytes 0.5 0.0 - 1.3 10e3/uL    Absolute Eosinophils 0.0 0.0 - 0.7 10e3/uL    Absolute Basophils 0.0 0.0 - 0.2 10e3/uL    Absolute Immature Granulocytes 0.0 <=0.4 10e3/uL           Signed Electronically by: Kamryn Gabriel MD

## 2025-04-16 LAB
APO A-I SERPL-MCNC: 7 MG/DL
FERRITIN SERPL-MCNC: 14 NG/ML (ref 6–175)

## 2025-07-29 ENCOUNTER — TELEPHONE (OUTPATIENT)
Dept: FAMILY MEDICINE | Facility: CLINIC | Age: 25
End: 2025-07-29